# Patient Record
Sex: MALE | Race: WHITE | NOT HISPANIC OR LATINO | Employment: OTHER | URBAN - METROPOLITAN AREA
[De-identification: names, ages, dates, MRNs, and addresses within clinical notes are randomized per-mention and may not be internally consistent; named-entity substitution may affect disease eponyms.]

---

## 2017-01-09 ENCOUNTER — OFFICE VISIT (OUTPATIENT)
Dept: NEUROSURGERY | Facility: CLINIC | Age: 57
End: 2017-01-09

## 2017-01-09 VITALS
DIASTOLIC BLOOD PRESSURE: 92 MMHG | WEIGHT: 315 LBS | BODY MASS INDEX: 42.66 KG/M2 | HEIGHT: 72 IN | SYSTOLIC BLOOD PRESSURE: 156 MMHG

## 2017-01-09 DIAGNOSIS — M51.26 DISPLACEMENT OF LUMBAR INTERVERTEBRAL DISC WITHOUT MYELOPATHY: Primary | ICD-10-CM

## 2017-01-09 PROCEDURE — 99213 OFFICE O/P EST LOW 20 MIN: CPT | Performed by: NEUROLOGICAL SURGERY

## 2017-01-09 RX ORDER — GABAPENTIN 300 MG/1
CAPSULE ORAL
Qty: 540 CAPSULE | Refills: 0 | Status: SHIPPED | OUTPATIENT
Start: 2017-01-09

## 2017-01-09 NOTE — PROGRESS NOTES
"Subjective   Patient ID: Tip Pruitt is a 56 y.o. male who is here today for follow-up for low back pain. He is unaccompanied for this visit today.     History of Present Illness  Overall he feels his pain is \"non existant\".  His lifestyle is much improved.  He has been doing some flights without issue.  [place] go [place].  He remains on gabapentin.  He has infrequent 1/10 pain.  No loss of b/b.  He remains on gabapentin 900 tid.        The following portions of the patient's history were reviewed and updated as appropriate: allergies, current medications, past family history, past medical history, past social history, past surgical history and problem list.    Review of Systems   Musculoskeletal: Negative for back pain.   Neurological: Negative for numbness.   Psychiatric/Behavioral: Negative for sleep disturbance.       Objective   Physical Exam  Neurologic Exam   Motor Exam      Strength    Right iliopsoas: 5/5  Left iliopsoas: 5/5  Right quadriceps: 5/5  Left quadriceps: 5/5  Right hamstrin/5  Left hamstrin/5  Right anterior tibial: 5/5  Left anterior tibial: 5/5  Right gastroc: 5/5  Left gastroc: 5/5  5/5 ehl      Sensory Exam   Right leg light touch: normal  Left leg light touch: normal  Right leg pinprick: normal  Left leg pinprick: normal     Gait, Coordination, and Reflexes      Gait  Gait: normal     Reflexes   Right patellar: 1+  Left patellar: 1+  Right achilles: 1+  Left achilles: 1+  Right ankle clonus: absent  Left ankle clonus: absent      1+ pitting edema (improved)    Assessment/Plan   Independent Review of Radiographic Studies:    I reviewed his MRI and has a far lat disc  Medical Decision Making:    He is pleased with his progress.  He is 95+% better.  We will refer to PT at his request.  He was started on anti-htn. He will continue the gabapentin for 3 more months then attempt to wean.         Tip was seen today for back pain.    Diagnoses and all orders for this " visit:    Displacement of lumbar intervertebral disc without myelopathy    Other orders  -     gabapentin (NEURONTIN) 300 MG capsule; Take 3 capsules tid      No Follow-up on file.

## 2017-01-09 NOTE — MR AVS SNAPSHOT
Tip Pruitt   1/9/2017 2:45 PM   Office Visit    Dept Phone:  [phone]   Encounter #:  [id number]    Provider:  Cooper Fleming IV, MD   Department:  [facility]                Your Full Care Plan              Today's Medication Changes          These changes are accurate as of: 1/9/17  3:30 PM.  If you have any questions, ask your nurse or doctor.               Medication(s)that have changed:     gabapentin 300 MG capsule   Commonly known as:  NEURONTIN   Take 3 capsules tid   What changed:    - how much to take  - how to take this  - when to take this  - additional instructions   Changed by:  Cooper Fleming IV, MD            Where to Get Your Medications      These medications were sent to [facility] - [place], [place] - [address] [place] [address] - [phone] [place] [id number] [address] [address]     Phone:  [phone]     gabapentin 300 MG capsule                  Your Updated Medication List          This list is accurate as of: 1/9/17  3:30 PM.  Always use your most recent med list.                acetaminophen 325 MG tablet   Commonly known as:  TYLENOL       aspirin 81 MG chewable tablet       atenolol 100 MG tablet   Commonly known as:  TENORMIN   Take 1 tablet by mouth daily.       gabapentin 300 MG capsule   Commonly known as:  NEURONTIN   Take 3 capsules tid       ibuprofen 100 MG tablet   Commonly known as:  ADVIL,MOTRIN       indapamide 2.5 MG tablet   Commonly known as:  LOZOL   Take 1 tablet by mouth Every Morning.       simvastatin 20 MG tablet   Commonly known as:  ZOCOR   Take 1 tablet by mouth every night.               You Were Diagnosed With        Codes Comments    Displacement of lumbar intervertebral disc without myelopathy    -  Primary ICD-10-CM: M51.26  ICD-9-CM: 722.10       Instructions     None    Patient Instructions History      Upcoming Appointments     Visit Type Date Time  "Department    OFFICE VISIT 1/9/2017  2:45 PM [facility]    OFFICE VISIT 6/5/2017  8:00 AM [facility]      MyChart Signup     Our records indicate that you have an active [facility] [employer] account.    You can view your After Visit Summary by going to [employer] and logging in with your [employer] username and password.  If you don't have a [employer] username and password but a parent or guardian has access to your record, the parent or guardian should login with their own [employer] username and password and access your record to view the After Visit Summary.    If you have questions, you can email [email or web address] or call [phone] to talk to our [employer] staff.  Remember, [employer] is NOT to be used for urgent needs.  For medical emergencies, dial 911.               Other Info from Your Visit           Your Appointments     Jun 05, 2017  8:00 AM EDT   Office Visit with Yohana Jeffries MD   [facility] MED[facility] (--)    [address] [address] [address] [phone]           Arrive 15 minutes prior to appointment.              Allergies     Latex      Sensitive to adhesive part of bandaid/not sure if it is to the latex    Naprosyn [Naproxen]        Reason for Visit     Back Pain           Vital Signs     Blood Pressure Height Weight Body Mass Index Smoking Status       156/92 (BP Location: Left arm, Patient Position: Sitting, Cuff Size: Adult) 72\" (182.9 cm) 364 lb (165 kg) 49.37 kg/m2 Never Smoker       Problems and Diagnoses Noted     Bulging disc of backbone        "

## 2017-01-30 DIAGNOSIS — E78.5 HYPERLIPIDEMIA, UNSPECIFIED HYPERLIPIDEMIA TYPE: ICD-10-CM

## 2017-01-30 DIAGNOSIS — I10 ESSENTIAL HYPERTENSION: ICD-10-CM

## 2017-01-30 RX ORDER — SIMVASTATIN 20 MG
20 TABLET ORAL NIGHTLY
Qty: 90 TABLET | Refills: 1 | Status: SHIPPED | OUTPATIENT
Start: 2017-01-30 | End: 2017-05-18 | Stop reason: SDUPTHER

## 2017-01-30 RX ORDER — ATENOLOL 100 MG/1
100 TABLET ORAL DAILY
Qty: 90 TABLET | Refills: 1 | Status: SHIPPED | OUTPATIENT
Start: 2017-01-30 | End: 2017-05-18 | Stop reason: SDUPTHER

## 2017-01-30 RX ORDER — INDAPAMIDE 2.5 MG/1
2.5 TABLET, FILM COATED ORAL EVERY MORNING
Qty: 90 TABLET | Refills: 1 | Status: SHIPPED | OUTPATIENT
Start: 2017-01-30 | End: 2017-02-06 | Stop reason: SDUPTHER

## 2017-02-03 DIAGNOSIS — I10 ESSENTIAL HYPERTENSION: ICD-10-CM

## 2017-02-04 RX ORDER — AMLODIPINE BESYLATE 2.5 MG/1
TABLET ORAL
Qty: 90 TABLET | Refills: 0 | OUTPATIENT
Start: 2017-02-04

## 2017-02-06 DIAGNOSIS — I10 ESSENTIAL HYPERTENSION: ICD-10-CM

## 2017-02-06 RX ORDER — INDAPAMIDE 2.5 MG/1
2.5 TABLET, FILM COATED ORAL EVERY MORNING
Qty: 90 TABLET | Refills: 1 | Status: SHIPPED | OUTPATIENT
Start: 2017-02-06

## 2017-02-13 ENCOUNTER — TELEPHONE (OUTPATIENT)
Dept: FAMILY MEDICINE CLINIC | Facility: CLINIC | Age: 57
End: 2017-02-13

## 2017-02-13 DIAGNOSIS — I10 ESSENTIAL HYPERTENSION: ICD-10-CM

## 2017-02-13 RX ORDER — AMLODIPINE BESYLATE 2.5 MG/1
TABLET ORAL
Qty: 90 TABLET | Refills: 0 | Status: SHIPPED | OUTPATIENT
Start: 2017-02-13 | End: 2017-02-13 | Stop reason: SDUPTHER

## 2017-02-13 RX ORDER — AMLODIPINE BESYLATE 2.5 MG/1
2.5 TABLET ORAL DAILY
Qty: 14 TABLET | Refills: 0 | Status: SHIPPED | OUTPATIENT
Start: 2017-02-13

## 2017-03-30 ENCOUNTER — TELEPHONE (OUTPATIENT)
Dept: NEUROSURGERY | Facility: CLINIC | Age: 57
End: 2017-03-30

## 2017-03-30 NOTE — TELEPHONE ENCOUNTER
----- Message from Daksha Joyce sent at 3/30/2017  3:06 PM EDT -----  Regarding: MEDICATION QUESTION  Contact: [phone]  Pt was last seen on 1/9/17 for lumbar and it was being treated with medication.  Pt said that he and Dr Fleming talked about him   backing off the Gabapentin 300 mg.  He did back off and was suddenly hit with the pain. He said it is in his right thigh.  He wants to know if he needs an ppt to get a refill or if Dr Fleming will give him one.  Pt would like to keep taking the medicine for his pain.    Thank you

## 2017-04-13 ENCOUNTER — TELEPHONE (OUTPATIENT)
Dept: NEUROSURGERY | Facility: CLINIC | Age: 57
End: 2017-04-13

## 2017-04-13 NOTE — TELEPHONE ENCOUNTER
Patient called and spoke to Daksha and requested a refill on his Gabapentin 300 mg x90 days. Per [facility]:  Okay to refill. I called [facility] and spoke to La.

## 2017-05-01 ENCOUNTER — TELEPHONE (OUTPATIENT)
Dept: NEUROSURGERY | Facility: CLINIC | Age: 57
End: 2017-05-01

## 2017-05-01 DIAGNOSIS — M54.5 LOW BACK PAIN, UNSPECIFIED BACK PAIN LATERALITY, UNSPECIFIED CHRONICITY, WITH SCIATICA PRESENCE UNSPECIFIED: Primary | ICD-10-CM

## 2017-05-07 DIAGNOSIS — I10 ESSENTIAL HYPERTENSION: ICD-10-CM

## 2017-05-08 RX ORDER — AMLODIPINE BESYLATE 2.5 MG/1
TABLET ORAL
Qty: 90 TABLET | Refills: 0 | Status: SHIPPED | OUTPATIENT
Start: 2017-05-08

## 2017-05-18 DIAGNOSIS — I10 ESSENTIAL HYPERTENSION: ICD-10-CM

## 2017-05-18 DIAGNOSIS — E78.5 HYPERLIPIDEMIA, UNSPECIFIED HYPERLIPIDEMIA TYPE: ICD-10-CM

## 2017-05-18 RX ORDER — ATENOLOL 100 MG/1
100 TABLET ORAL DAILY
Qty: 90 TABLET | Refills: 0 | Status: SHIPPED | OUTPATIENT
Start: 2017-05-18

## 2017-05-18 RX ORDER — SIMVASTATIN 20 MG
20 TABLET ORAL NIGHTLY
Qty: 90 TABLET | Refills: 0 | Status: SHIPPED | OUTPATIENT
Start: 2017-05-18

## 2017-07-21 ENCOUNTER — TELEPHONE (OUTPATIENT)
Dept: NEUROSURGERY | Facility: CLINIC | Age: 57
End: 2017-07-21

## 2017-07-21 NOTE — TELEPHONE ENCOUNTER
I received a call from [employer] to approve a refill for Gabapentin. I advised that this patient needs to see PCP we are no longer following this patient.

## 2020-03-23 ENCOUNTER — TRANSCRIBE ORDERS (OUTPATIENT)
Dept: ADMINISTRATIVE | Facility: HOSPITAL | Age: 60
End: 2020-03-23

## 2020-03-23 DIAGNOSIS — R10.11 ABDOMINAL PAIN, RIGHT UPPER QUADRANT: Primary | ICD-10-CM

## 2020-03-26 ENCOUNTER — HOSPITAL ENCOUNTER (OUTPATIENT)
Dept: CT IMAGING | Facility: HOSPITAL | Age: 60
Discharge: HOME | End: 2020-03-26
Admitting: UROLOGY

## 2020-03-26 ENCOUNTER — APPOINTMENT (OUTPATIENT)
Dept: CT IMAGING | Facility: HOSPITAL | Age: 60
End: 2020-03-26

## 2020-03-26 DIAGNOSIS — R10.11 ABDOMINAL PAIN, RIGHT UPPER QUADRANT: ICD-10-CM

## 2020-03-26 PROCEDURE — 74176 CT ABD & PELVIS W/O CONTRAST: CPT

## 2021-03-22 ENCOUNTER — BULK ORDERING (OUTPATIENT)
Dept: CASE MANAGEMENT | Facility: OTHER | Age: 61
End: 2021-03-22

## 2021-03-22 DIAGNOSIS — Z23 IMMUNIZATION DUE: ICD-10-CM

## 2021-04-16 ENCOUNTER — APPOINTMENT (OUTPATIENT)
Dept: VACCINE CLINIC | Facility: HOSPITAL | Age: 61
End: 2021-04-16

## 2021-04-24 ENCOUNTER — IMMUNIZATION (OUTPATIENT)
Dept: VACCINE CLINIC | Facility: HOSPITAL | Age: 61
End: 2021-04-24

## 2021-04-24 PROCEDURE — 91300 HC SARSCOV02 VAC 30MCG/0.3ML IM: CPT | Performed by: INTERNAL MEDICINE

## 2021-04-24 PROCEDURE — 0001A: CPT | Performed by: INTERNAL MEDICINE

## 2021-05-15 ENCOUNTER — IMMUNIZATION (OUTPATIENT)
Dept: VACCINE CLINIC | Facility: HOSPITAL | Age: 61
End: 2021-05-15

## 2021-05-15 PROCEDURE — 0002A: CPT | Performed by: INTERNAL MEDICINE

## 2021-05-15 PROCEDURE — 91300 HC SARSCOV02 VAC 30MCG/0.3ML IM: CPT | Performed by: INTERNAL MEDICINE

## 2023-04-15 ENCOUNTER — TELEMEDICINE (OUTPATIENT)
Dept: FAMILY MEDICINE CLINIC | Facility: TELEHEALTH | Age: 63
End: 2023-04-15
Payer: COMMERCIAL

## 2023-04-15 DIAGNOSIS — N41.0 ACUTE PROSTATITIS: Primary | ICD-10-CM

## 2023-04-15 RX ORDER — LOSARTAN POTASSIUM 50 MG/1
1 TABLET ORAL DAILY
COMMUNITY
Start: 2023-02-01 | End: 2023-04-15

## 2023-04-15 RX ORDER — EPINEPHRINE 0.3 MG/.3ML
0.3 INJECTION SUBCUTANEOUS DAILY PRN
COMMUNITY
Start: 2022-09-27 | End: 2023-09-27

## 2023-04-15 RX ORDER — SULFAMETHOXAZOLE AND TRIMETHOPRIM 800; 160 MG/1; MG/1
1 TABLET ORAL 2 TIMES DAILY
Qty: 14 TABLET | Refills: 0 | Status: SHIPPED | OUTPATIENT
Start: 2023-04-15 | End: 2023-04-22

## 2023-04-15 RX ORDER — ATORVASTATIN CALCIUM 40 MG/1
1 TABLET, FILM COATED ORAL DAILY
COMMUNITY
Start: 2023-03-23

## 2023-04-15 RX ORDER — TAMSULOSIN HYDROCHLORIDE 0.4 MG/1
1 CAPSULE ORAL EVERY 12 HOURS SCHEDULED
COMMUNITY
Start: 2023-03-29

## 2023-04-15 RX ORDER — LOSARTAN POTASSIUM 50 MG/1
1 TABLET ORAL DAILY
COMMUNITY
Start: 2019-03-01

## 2023-04-15 RX ORDER — SILDENAFIL CITRATE 20 MG/1
20 TABLET ORAL DAILY PRN
COMMUNITY
Start: 2017-06-01

## 2023-04-15 RX ORDER — ATORVASTATIN CALCIUM 40 MG/1
TABLET, FILM COATED ORAL
COMMUNITY
Start: 2019-03-01 | End: 2023-04-15

## 2023-04-15 RX ORDER — BLOOD SUGAR DIAGNOSTIC
STRIP MISCELLANEOUS DAILY
COMMUNITY
Start: 2023-04-02

## 2023-04-15 RX ORDER — ASPIRIN 81 MG/1
TABLET, CHEWABLE ORAL
COMMUNITY
End: 2023-04-15

## 2023-04-15 RX ORDER — ACETAMINOPHEN 325 MG/1
650 TABLET ORAL
COMMUNITY

## 2023-04-15 NOTE — PROGRESS NOTES
CHIEF COMPLAINT  Chief Complaint   Patient presents with   • Urinary Frequency         HPI  Tip Pruitt is a 62 y.o. male  presents with complaint of prostatitis. He has a history and sees a urologist. He takes Bactrim DS and this resolves his infections.   He has prostate tenderness, urinary urgency and mild burning.     Review of Systems   Constitutional: Negative for chills, diaphoresis, fatigue and fever.   Gastrointestinal: Negative for nausea and vomiting.   Genitourinary: Positive for dysuria, frequency and urgency. Negative for decreased urine volume, difficulty urinating, enuresis, flank pain, genital sores, hematuria, penile discharge, penile pain, penile swelling, scrotal swelling and testicular pain.       Past Medical History:   Diagnosis Date   • Hyperlipidemia    • Hypertension    • Obesity    • Sleep apnea        Family History   Problem Relation Age of Onset   • Diabetes type II Father    • Pancreatic cancer Father    • Diabetes type II Brother    • Glaucoma Maternal Grandmother    • Stroke Maternal Grandmother    • Alcohol abuse Maternal Grandfather    • Diabetes type II Paternal Grandfather    • Heart disease Paternal Grandfather        Social History     Socioeconomic History   • Marital status:    Tobacco Use   • Smoking status: Never     Passive exposure: Never   • Smokeless tobacco: Never   Vaping Use   • Vaping Use: Never used   Substance and Sexual Activity   • Alcohol use: Yes     Comment: rare   • Drug use: No   • Sexual activity: Yes     Partners: Female       Tip Pruitt  reports that he has never smoked. He has never been exposed to tobacco smoke. He has never used smokeless tobacco.       There were no vitals taken for this visit.    PHYSICAL EXAM  Physical Exam   Constitutional: He is oriented to person, place, and time. He appears well-developed and well-nourished. He does not have a sickly appearance. He does not appear ill. No distress.   HENT:   Head:  Normocephalic and atraumatic.   Eyes: EOM are normal.   Neck: Neck normal appearance.  Pulmonary/Chest: Effort normal.  No respiratory distress.  Neurological: He is alert and oriented to person, place, and time.   Skin: Skin is dry.   Psychiatric: He has a normal mood and affect.       No results found for this or any previous visit.    Diagnoses and all orders for this visit:    1. Acute prostatitis (Primary)    Other orders  -     sulfamethoxazole-trimethoprim (Bactrim DS) 800-160 MG per tablet; Take 1 tablet by mouth 2 (Two) Times a Day for 7 days.  Dispense: 14 tablet; Refill: 0    Continue to follow up with your urologist.     The use of a video visit has been reviewed with the patient and verbal informed consent has been obtained. Myself and Tip Pruitt participated in this visit. The patient is located in [address] [place] [address]. I am located in [place], [place]. [employer] and [employer] were utilized.       Note Disclaimer: At [facility], we believe that sharing information builds trust and better   relationships. You are receiving this note because you recently visited [facility]. It is possible you   will see health information before a provider has talked with you about it. This kind of information can   be easy to misunderstand. To help you fully understand what it means for your health, we urge you to   discuss this note with your provider.    Lacie Madden, MISSY  04/15/2023  10:07 EDT

## 2023-04-15 NOTE — PATIENT INSTRUCTIONS
Drink plenty of water and avoid caffeine  If symptoms do not improve in 3-5 days follow up with your primary care provider or urgent care        Prostatitis    Prostatitis is swelling of the prostate gland, also called the prostate. This gland is about 1.5 inches wide and 1 inch high, and it helps to make a fluid called semen. The prostate is below a man's bladder, in front of the butt (rectum). There are different types of prostatitis.  What are the causes?  One type of prostatitis is caused by an infection from germs (bacteria).  Another type is not caused by germs. It may be caused by:  Things having to do with the nervous system. This system includes thebrain, spinal cord, and nerves.  An autoimmune response. This happens when the body's disease-fighting system attacks healthy tissue in the body by mistake.  Psychological factors. These have to do with how the mind works.  The causes of other types of prostatitis are normally not known.  What are the signs or symptoms?  Symptoms of this condition depend on the type of prostatitis you have.  If your condition is caused by germs:  You may feel pain or burning when you pee (urinate).  You may pee often and all of a sudden.  You may have problems starting to pee.  You may have trouble emptying your bladder when you pee.  You may have fever or chills.  You may feel pain in your muscles, joints, low back, or lower belly.  If you have other types of prostatitis:  You may pee often or all of a sudden.  You may have trouble starting to pee.  You may have a weak flow when you pee.  You may leak pee after using the bathroom.  You may have other problems, such as:  Abnormal fluid coming from the penis.  Pain in the testicles or penis.  Pain between the butt and the testicles.  Pain when fluid comes out of the penis during sex.  How is this treated?  Treatment for this condition depends on the type of prostatitis. Treatment may include:  Medicines. These may treat pain or  swelling, or they may help relax muscles.  Exercises to help you move better or get stronger (physical therapy).  Heat therapy.  Techniques to help you control some of the ways that your body works.  Exercises to help you relax.  Antibiotic medicine, if your condition is caused by germs.  Warm water baths (sitz baths) to relax muscles.  Follow these instructions at home:  Medicines  Take over-the-counter and prescription medicines only as told by your doctor.  If you were prescribed an antibiotic medicine, take it as told by your doctor. Do not stop using the antibiotic even if you start to feel better.  Managing pain and swelling    Take sitz baths as told by your doctor. For a sitz bath, sit in warm water that is deep enough to cover your hips and butt.  If told, put heat on the painful area. Do this as often as told by your doctor. Use the heat source that your doctor recommends, such as a moist heat pack or a heating pad.  Place a towel between your skin and the heat source.  Leave the heat on for 20-30 minutes.  Take off the heat if your skin turns bright red. This is very important if you are unable to feel pain, heat, or cold. You may have a greater risk of getting burned.  General instructions  Do exercises as told by your doctor, if your doctor prescribed them.  Keep all follow-up visits as told by your doctor. This is important.  Where to find more information  National [place] of Diabetes and Digestive and Kidney Diseases: https://www.niddk.nih.gov  Contact a doctor if:  Your symptoms get worse.  You have a fever.  Get help right away if:  You have chills.  You feel light-headed.  You feel like you may faint.  You cannot pee.  You have blood or clumps of blood (blood clots) in your pee.  Summary  Prostatitis is swelling of the prostate gland.  There are different types of prostatitis. Treatment depends on the type that you have.  Take over-the-counter and prescription medicines only as told by your  doctor.  Get help right away of you have chills, feel light-headed, or feel like you may faint. Also get help right away if you cannot pee or you have blood or clumps of blood in your pee.  This information is not intended to replace advice given to you by your health care provider. Make sure you discuss any questions you have with your health care provider.  Document Revised: 01/22/2021 Document Reviewed: 01/22/2021  Elsevier Patient Education © 2022 Elsevier Inc.

## 2025-05-30 ENCOUNTER — TRANSCRIBE ORDERS (OUTPATIENT)
Dept: ADMINISTRATIVE | Facility: HOSPITAL | Age: 65
End: 2025-05-30
Payer: COMMERCIAL

## 2025-05-30 DIAGNOSIS — N18.2 CKD (CHRONIC KIDNEY DISEASE) STAGE 2, GFR 60-89 ML/MIN: Primary | ICD-10-CM

## 2025-07-09 ENCOUNTER — APPOINTMENT (OUTPATIENT)
Dept: NEUROLOGY | Facility: HOSPITAL | Age: 65
End: 2025-07-09
Payer: COMMERCIAL

## 2025-07-09 ENCOUNTER — APPOINTMENT (OUTPATIENT)
Dept: MRI IMAGING | Facility: HOSPITAL | Age: 65
End: 2025-07-09
Payer: COMMERCIAL

## 2025-07-09 ENCOUNTER — APPOINTMENT (OUTPATIENT)
Dept: GENERAL RADIOLOGY | Facility: HOSPITAL | Age: 65
End: 2025-07-09
Payer: COMMERCIAL

## 2025-07-09 ENCOUNTER — APPOINTMENT (OUTPATIENT)
Dept: CT IMAGING | Facility: HOSPITAL | Age: 65
End: 2025-07-09
Payer: COMMERCIAL

## 2025-07-09 ENCOUNTER — HOSPITAL ENCOUNTER (OUTPATIENT)
Facility: HOSPITAL | Age: 65
Setting detail: OBSERVATION
Discharge: HOME | End: 2025-07-10
Attending: EMERGENCY MEDICINE | Admitting: EMERGENCY MEDICINE
Payer: COMMERCIAL

## 2025-07-09 DIAGNOSIS — G45.4 TGA (TRANSIENT GLOBAL AMNESIA): Primary | ICD-10-CM

## 2025-07-09 LAB
ALBUMIN SERPL-MCNC: 3.9 G/DL (ref 3.5–5.2)
ALBUMIN/GLOB SERPL: 1.1 G/DL
ALP SERPL-CCNC: 52 U/L (ref 39–117)
ALT SERPL W P-5'-P-CCNC: 21 U/L (ref 1–41)
ANION GAP SERPL CALCULATED.3IONS-SCNC: 9.4 MMOL/L (ref 5–15)
APTT PPP: 28.8 SECONDS (ref 22.7–35.4)
AST SERPL-CCNC: 24 U/L (ref 1–40)
BACTERIA UR QL AUTO: ABNORMAL /HPF
BASOPHILS # BLD AUTO: 0.04 10*3/MM3 (ref 0–0.2)
BASOPHILS NFR BLD AUTO: 0.7 % (ref 0–1.5)
BILIRUB SERPL-MCNC: 0.7 MG/DL (ref 0–1.2)
BILIRUB UR QL STRIP: NEGATIVE
BUN SERPL-MCNC: 18 MG/DL (ref 8–23)
BUN/CREAT SERPL: 15.8 (ref 7–25)
CALCIUM SPEC-SCNC: 9.7 MG/DL (ref 8.6–10.5)
CHLORIDE SERPL-SCNC: 106 MMOL/L (ref 98–107)
CHOLEST SERPL-MCNC: 142 MG/DL (ref 0–200)
CLARITY UR: CLEAR
CO2 SERPL-SCNC: 25.6 MMOL/L (ref 22–29)
COLOR UR: YELLOW
CREAT SERPL-MCNC: 1.14 MG/DL (ref 0.76–1.27)
DEPRECATED RDW RBC AUTO: 44.9 FL (ref 37–54)
EGFRCR SERPLBLD CKD-EPI 2021: 71.8 ML/MIN/1.73
EOSINOPHIL # BLD AUTO: 0.11 10*3/MM3 (ref 0–0.4)
EOSINOPHIL NFR BLD AUTO: 1.9 % (ref 0.3–6.2)
ERYTHROCYTE [DISTWIDTH] IN BLOOD BY AUTOMATED COUNT: 13.9 % (ref 12.3–15.4)
GLOBULIN UR ELPH-MCNC: 3.4 GM/DL
GLUCOSE BLDC GLUCOMTR-MCNC: 108 MG/DL (ref 70–130)
GLUCOSE BLDC GLUCOMTR-MCNC: 82 MG/DL (ref 70–130)
GLUCOSE BLDC GLUCOMTR-MCNC: 97 MG/DL (ref 70–130)
GLUCOSE SERPL-MCNC: 108 MG/DL (ref 65–99)
GLUCOSE UR STRIP-MCNC: NEGATIVE MG/DL
HBA1C MFR BLD: 6.1 % (ref 4.8–5.6)
HCT VFR BLD AUTO: 52.1 % (ref 37.5–51)
HDLC SERPL-MCNC: 46 MG/DL (ref 40–60)
HGB BLD-MCNC: 16.9 G/DL (ref 13–17.7)
HGB UR QL STRIP.AUTO: ABNORMAL
HOLD SPECIMEN: NORMAL
HOLD SPECIMEN: NORMAL
HYALINE CASTS UR QL AUTO: ABNORMAL /LPF
IMM GRANULOCYTES # BLD AUTO: 0.02 10*3/MM3 (ref 0–0.05)
IMM GRANULOCYTES NFR BLD AUTO: 0.3 % (ref 0–0.5)
INR PPP: 1.04 (ref 0.9–1.1)
KETONES UR QL STRIP: NEGATIVE
LDLC SERPL CALC-MCNC: 77 MG/DL (ref 0–100)
LDLC/HDLC SERPL: 1.63 {RATIO}
LEUKOCYTE ESTERASE UR QL STRIP.AUTO: NEGATIVE
LYMPHOCYTES # BLD AUTO: 1.04 10*3/MM3 (ref 0.7–3.1)
LYMPHOCYTES NFR BLD AUTO: 17.9 % (ref 19.6–45.3)
MCH RBC QN AUTO: 28.9 PG (ref 26.6–33)
MCHC RBC AUTO-ENTMCNC: 32.4 G/DL (ref 31.5–35.7)
MCV RBC AUTO: 89.1 FL (ref 79–97)
MONOCYTES # BLD AUTO: 0.59 10*3/MM3 (ref 0.1–0.9)
MONOCYTES NFR BLD AUTO: 10.1 % (ref 5–12)
NEUTROPHILS NFR BLD AUTO: 4.02 10*3/MM3 (ref 1.7–7)
NEUTROPHILS NFR BLD AUTO: 69.1 % (ref 42.7–76)
NITRITE UR QL STRIP: NEGATIVE
NRBC BLD AUTO-RTO: 0 /100 WBC (ref 0–0.2)
PH UR STRIP.AUTO: 6.5 [PH] (ref 5–8)
PLATELET # BLD AUTO: 188 10*3/MM3 (ref 140–450)
PMV BLD AUTO: 10 FL (ref 6–12)
POTASSIUM SERPL-SCNC: 4.3 MMOL/L (ref 3.5–5.2)
PROT SERPL-MCNC: 7.3 G/DL (ref 6–8.5)
PROT UR QL STRIP: ABNORMAL
PROTHROMBIN TIME: 13.5 SECONDS (ref 11.7–14.2)
RBC # BLD AUTO: 5.85 10*6/MM3 (ref 4.14–5.8)
RBC # UR STRIP: ABNORMAL /HPF
REF LAB TEST METHOD: ABNORMAL
SODIUM SERPL-SCNC: 141 MMOL/L (ref 136–145)
SP GR UR STRIP: >1.03 (ref 1–1.03)
SQUAMOUS #/AREA URNS HPF: ABNORMAL /HPF
TRIGL SERPL-MCNC: 105 MG/DL (ref 0–150)
UROBILINOGEN UR QL STRIP: ABNORMAL
VLDLC SERPL-MCNC: 19 MG/DL (ref 5–40)
WBC # UR STRIP: ABNORMAL /HPF
WBC NRBC COR # BLD AUTO: 5.82 10*3/MM3 (ref 3.4–10.8)
WHOLE BLOOD HOLD COAG: NORMAL
WHOLE BLOOD HOLD SPECIMEN: NORMAL

## 2025-07-09 PROCEDURE — 85025 COMPLETE CBC W/AUTO DIFF WBC: CPT | Performed by: EMERGENCY MEDICINE

## 2025-07-09 PROCEDURE — 81001 URINALYSIS AUTO W/SCOPE: CPT | Performed by: NURSE PRACTITIONER

## 2025-07-09 PROCEDURE — G0378 HOSPITAL OBSERVATION PER HR: HCPCS

## 2025-07-09 PROCEDURE — 25510000001 IOPAMIDOL PER 1 ML: Performed by: EMERGENCY MEDICINE

## 2025-07-09 PROCEDURE — 80053 COMPREHEN METABOLIC PANEL: CPT | Performed by: EMERGENCY MEDICINE

## 2025-07-09 PROCEDURE — 96374 THER/PROPH/DIAG INJ IV PUSH: CPT

## 2025-07-09 PROCEDURE — 85610 PROTHROMBIN TIME: CPT | Performed by: EMERGENCY MEDICINE

## 2025-07-09 PROCEDURE — 95816 EEG AWAKE AND DROWSY: CPT | Performed by: PSYCHIATRY & NEUROLOGY

## 2025-07-09 PROCEDURE — 82948 REAGENT STRIP/BLOOD GLUCOSE: CPT

## 2025-07-09 PROCEDURE — 25010000002 LABETALOL 5 MG/ML SOLUTION: Performed by: EMERGENCY MEDICINE

## 2025-07-09 PROCEDURE — 83036 HEMOGLOBIN GLYCOSYLATED A1C: CPT | Performed by: NURSE PRACTITIONER

## 2025-07-09 PROCEDURE — 70551 MRI BRAIN STEM W/O DYE: CPT

## 2025-07-09 PROCEDURE — 85730 THROMBOPLASTIN TIME PARTIAL: CPT | Performed by: NURSE PRACTITIONER

## 2025-07-09 PROCEDURE — 95819 EEG AWAKE AND ASLEEP: CPT

## 2025-07-09 PROCEDURE — 99285 EMERGENCY DEPT VISIT HI MDM: CPT

## 2025-07-09 PROCEDURE — 70498 CT ANGIOGRAPHY NECK: CPT

## 2025-07-09 PROCEDURE — 80061 LIPID PANEL: CPT | Performed by: NURSE PRACTITIONER

## 2025-07-09 PROCEDURE — 71045 X-RAY EXAM CHEST 1 VIEW: CPT

## 2025-07-09 PROCEDURE — 93005 ELECTROCARDIOGRAM TRACING: CPT | Performed by: EMERGENCY MEDICINE

## 2025-07-09 PROCEDURE — 70496 CT ANGIOGRAPHY HEAD: CPT

## 2025-07-09 PROCEDURE — 93010 ELECTROCARDIOGRAM REPORT: CPT | Performed by: INTERNAL MEDICINE

## 2025-07-09 RX ORDER — SODIUM CHLORIDE 9 MG/ML
40 INJECTION, SOLUTION INTRAVENOUS AS NEEDED
Status: DISCONTINUED | OUTPATIENT
Start: 2025-07-09 | End: 2025-07-10 | Stop reason: HOSPADM

## 2025-07-09 RX ORDER — ATENOLOL 50 MG/1
100 TABLET ORAL DAILY
Status: DISCONTINUED | OUTPATIENT
Start: 2025-07-09 | End: 2025-07-10 | Stop reason: HOSPADM

## 2025-07-09 RX ORDER — SODIUM CHLORIDE 0.9 % (FLUSH) 0.9 %
10 SYRINGE (ML) INJECTION AS NEEDED
Status: DISCONTINUED | OUTPATIENT
Start: 2025-07-09 | End: 2025-07-10 | Stop reason: HOSPADM

## 2025-07-09 RX ORDER — ASPIRIN 300 MG/1
300 SUPPOSITORY RECTAL DAILY
Status: DISCONTINUED | OUTPATIENT
Start: 2025-07-09 | End: 2025-07-10 | Stop reason: HOSPADM

## 2025-07-09 RX ORDER — ACETAMINOPHEN 325 MG/1
650 TABLET ORAL EVERY 4 HOURS PRN
Status: DISCONTINUED | OUTPATIENT
Start: 2025-07-09 | End: 2025-07-10 | Stop reason: HOSPADM

## 2025-07-09 RX ORDER — ONDANSETRON 2 MG/ML
4 INJECTION INTRAMUSCULAR; INTRAVENOUS EVERY 6 HOURS PRN
Status: DISCONTINUED | OUTPATIENT
Start: 2025-07-09 | End: 2025-07-10 | Stop reason: HOSPADM

## 2025-07-09 RX ORDER — ATORVASTATIN CALCIUM 20 MG/1
40 TABLET, FILM COATED ORAL NIGHTLY
Status: DISCONTINUED | OUTPATIENT
Start: 2025-07-09 | End: 2025-07-09

## 2025-07-09 RX ORDER — ASPIRIN 325 MG
325 TABLET ORAL DAILY
Status: DISCONTINUED | OUTPATIENT
Start: 2025-07-09 | End: 2025-07-10 | Stop reason: HOSPADM

## 2025-07-09 RX ORDER — LOSARTAN POTASSIUM 50 MG/1
50 TABLET ORAL EVERY 12 HOURS
Status: DISCONTINUED | OUTPATIENT
Start: 2025-07-09 | End: 2025-07-10 | Stop reason: HOSPADM

## 2025-07-09 RX ORDER — LABETALOL HYDROCHLORIDE 5 MG/ML
10 INJECTION, SOLUTION INTRAVENOUS ONCE
Status: COMPLETED | OUTPATIENT
Start: 2025-07-09 | End: 2025-07-09

## 2025-07-09 RX ORDER — ATORVASTATIN CALCIUM 20 MG/1
40 TABLET, FILM COATED ORAL NIGHTLY
Status: DISCONTINUED | OUTPATIENT
Start: 2025-07-09 | End: 2025-07-10 | Stop reason: HOSPADM

## 2025-07-09 RX ORDER — TAMSULOSIN HYDROCHLORIDE 0.4 MG/1
0.4 CAPSULE ORAL DAILY
Status: DISCONTINUED | OUTPATIENT
Start: 2025-07-09 | End: 2025-07-10 | Stop reason: HOSPADM

## 2025-07-09 RX ORDER — SODIUM CHLORIDE 0.9 % (FLUSH) 0.9 %
10 SYRINGE (ML) INJECTION EVERY 12 HOURS SCHEDULED
Status: DISCONTINUED | OUTPATIENT
Start: 2025-07-09 | End: 2025-07-10 | Stop reason: HOSPADM

## 2025-07-09 RX ORDER — INSULIN LISPRO 100 [IU]/ML
2-9 INJECTION, SOLUTION INTRAVENOUS; SUBCUTANEOUS
Status: DISCONTINUED | OUTPATIENT
Start: 2025-07-09 | End: 2025-07-10 | Stop reason: HOSPADM

## 2025-07-09 RX ORDER — IOPAMIDOL 755 MG/ML
100 INJECTION, SOLUTION INTRAVASCULAR
Status: COMPLETED | OUTPATIENT
Start: 2025-07-09 | End: 2025-07-09

## 2025-07-09 RX ORDER — NICOTINE POLACRILEX 4 MG
15 LOZENGE BUCCAL
Status: DISCONTINUED | OUTPATIENT
Start: 2025-07-09 | End: 2025-07-10 | Stop reason: HOSPADM

## 2025-07-09 RX ORDER — DEXTROSE MONOHYDRATE 25 G/50ML
25 INJECTION, SOLUTION INTRAVENOUS
Status: DISCONTINUED | OUTPATIENT
Start: 2025-07-09 | End: 2025-07-10 | Stop reason: HOSPADM

## 2025-07-09 RX ORDER — IBUPROFEN 600 MG/1
1 TABLET ORAL
Status: DISCONTINUED | OUTPATIENT
Start: 2025-07-09 | End: 2025-07-10 | Stop reason: HOSPADM

## 2025-07-09 RX ADMIN — LABETALOL HYDROCHLORIDE 10 MG: 5 INJECTION, SOLUTION INTRAVENOUS at 14:05

## 2025-07-09 RX ADMIN — Medication 10 ML: at 23:54

## 2025-07-09 RX ADMIN — ATENOLOL 100 MG: 50 TABLET ORAL at 18:46

## 2025-07-09 RX ADMIN — ASPIRIN 325 MG ORAL TABLET 325 MG: 325 PILL ORAL at 18:24

## 2025-07-09 RX ADMIN — LOSARTAN POTASSIUM 50 MG: 50 TABLET, FILM COATED ORAL at 18:25

## 2025-07-09 RX ADMIN — TAMSULOSIN HYDROCHLORIDE 0.4 MG: 0.4 CAPSULE ORAL at 18:26

## 2025-07-09 RX ADMIN — IOPAMIDOL 100 ML: 755 INJECTION, SOLUTION INTRAVENOUS at 15:58

## 2025-07-09 NOTE — ED NOTES
Nursing report ED to floor  Tip Pruitt  64 y.o.  male    HPI :  HPI  Stated Reason for Visit: ams x1hr    Chief Complaint  Chief Complaint   Patient presents with    Altered Mental Status       Admitting doctor:   Matias Eugene MD    Admitting diagnosis:   The encounter diagnosis was TGA (transient global amnesia).    Code status:   Current Code Status       Date Active Code Status Order ID Comments User Context       7/9/2025 1604 CPR (Attempt to Resuscitate) [id number]  Kiley Levy APRN ED        Question Answer    Code Status (Patient has no pulse and is not breathing) CPR (Attempt to Resuscitate)    Medical Interventions (Patient has pulse or is breathing) Full Support    Level Of Support Discussed With Patient                    Allergies:   Naprosyn [naproxen] and Other    Isolation:   No active isolations    Intake and Output  No intake or output data in the 24 hours ending 07/09/25 1610    Weight:   There were no vitals filed for this visit.    Most recent vitals:   Vitals:    07/09/25 1351 07/09/25 1400 07/09/25 1405 07/09/25 1500   BP:  (!) 186/82 (!) 186/82 167/74   Pulse: 60 69 55 54   Resp: 18      Temp: 97.7 °F (36.5 °C)      TempSrc: Oral      SpO2: 98% 98%  100%       Active LDAs/IV Access:   Lines, Drains & Airways       Active LDAs       Name Placement date Placement time Site Days    Peripheral IV 07/09/25 1401 18 G Left Antecubital 07/09/25  1401  Antecubital  less than 1                    Labs (abnormal labs have a star):   Labs Reviewed   COMPREHENSIVE METABOLIC PANEL - Abnormal; Notable for the following components:       Result Value    Glucose 108 (*)     All other components within normal limits    Narrative:     GFR Categories in Chronic Kidney Disease (CKD)              GFR Category          GFR (mL/min/1.73)    Interpretation  G1                    90 or greater        Normal or high (1)  G2                    60-89                Mild decrease (1)  G3a                    45-59                Mild to moderate decrease  G3b                   30-44                Moderate to severe decrease  G4                    15-29                Severe decrease  G5                    14 or less           Kidney failure    (1)In the absence of evidence of kidney disease, neither GFR category G1 or G2 fulfill the criteria for CKD.    eGFR calculation 2021 CKD-EPI creatinine equation, which does not include race as a factor   CBC WITH AUTO DIFFERENTIAL - Abnormal; Notable for the following components:    RBC 5.85 (*)     Hematocrit 52.1 (*)     Lymphocyte % 17.9 (*)     All other components within normal limits   PROTIME-INR - Normal   POCT GLUCOSE FINGERSTICK - Normal   RAINBOW DRAW    Narrative:     The following orders were created for panel order [place] Draw.  Procedure                               Abnormality         Status                     ---------                               -----------         ------                     Green Top (Gel)[[id number]]                                  Final result               Lavender Top[[id number]]                                     Final result               Gold Top - SST[[id number]]                                   Final result               Light Blue Top[[id number]]                                   Final result                 Please view results for these tests on the individual orders.   HEMOGLOBIN A1C   LIPID PANEL   APTT   POCT GLUCOSE FINGERSTICK   POCT GLUCOSE FINGERSTICK   POCT GLUCOSE FINGERSTICK   POCT GLUCOSE FINGERSTICK   POCT GLUCOSE FINGERSTICK   CBC AND DIFFERENTIAL    Narrative:     The following orders were created for panel order CBC & Differential.  Procedure                               Abnormality         Status                     ---------                               -----------         ------                     CBC Auto Differential[[id number]]        Abnormal            Final result                 Please view results for  these tests on the individual orders.   GREEN TOP   LAVENDER TOP   GOLD TOP - SST   LIGHT BLUE TOP       EKG:   ECG 12 Lead ED Triage Standing Order; Stroke (Onset >12 hrs)   Preliminary Result   HEART RATE=55  bpm   RR Qwuhmyhz=9014  ms   MN Zzdvtldi=729  ms   P Horizontal Axis=-2  deg   P Front Axis=-1  deg   QRSD Interval=95  ms   QT Wdsaxjph=547  ms   USbX=878  ms   QRS Axis=-1  deg   T Wave Axis=-3  deg   - BORDERLINE ECG -   Sinus rhythm   LVH by voltage   Borderline T abnormalities, inferior leads   When compared with ECG of 29-Jul-2013 06:39:21,   No significant change   Date and Time of Study:2025-07-09 14:30:27          Meds given in ED:   Medications   sodium chloride 0.9 % flush 10 mL (has no administration in time range)   sodium chloride 0.9 % flush 10 mL (has no administration in time range)   sodium chloride 0.9 % flush 10 mL (has no administration in time range)   sodium chloride 0.9 % infusion 40 mL (has no administration in time range)   atorvastatin (LIPITOR) tablet 40 mg (has no administration in time range)   ondansetron (ZOFRAN) injection 4 mg (has no administration in time range)   aspirin tablet 325 mg (has no administration in time range)     Or   aspirin suppository 300 mg (has no administration in time range)   losartan (COZAAR) tablet 50 mg (has no administration in time range)   labetalol (NORMODYNE,TRANDATE) injection 10 mg (10 mg Intravenous Given 7/9/25 1405)   iopamidol (ISOVUE-370) 76 % injection 100 mL (100 mL Intravenous Given 7/9/25 1558)       Imaging results:  XR Chest 1 View  Result Date: 7/9/2025  No focal pulmonary consolidation. Borderline heart size. Tortuous aorta. Follow-up as clinically indicated.  This report was finalized on 7/9/2025 2:57 PM by Dr. Cornelio Camarena M.D on Workstation: [id number]        Ambulatory status:   - assist     Social issues:   Social History     Socioeconomic History    Marital status:    Tobacco Use    Smoking status: Never      "Passive exposure: Never    Smokeless tobacco: Never   Vaping Use    Vaping status: Never Used   Substance and Sexual Activity    Alcohol use: Yes     Comment: rare    Drug use: No    Sexual activity: Yes     Partners: Female       Peripheral Neurovascular  Peripheral Neurovascular (Adult)  Peripheral Neurovascular WDL: WDL    Neuro Cognitive  Neuro Cognitive (Adult)  Cognitive/Neuro/Behavioral WDL: WDL (\"forgetful per family\" pt reports that he is confused)  Last Known Well Date: 07/09/25  Last Known Well Time: 1250  Additional Documentation: [place] Coma Scale (Group), Last Known Well Date (Row), Last Known Well Time (Row), NIH Stroke Scale (group)  [place] Coma Scale  Best Eye Response: 4-->(E4) spontaneous  Best Motor Response: 6-->(M6) obeys commands  Best Verbal Response: 5-->(V5) oriented  [place] Coma Scale Score: 15  NIH Stroke Scale  Interval: baseline  1a. Level of Consciousness: 0-->Alert, keenly responsive  1b. LOC Questions: 0-->Answers both questions correctly  1c. LOC Commands: 0-->Performs both tasks correctly  2. Best Gaze: 0-->Normal  3. Visual: 0-->No visual loss  4. Facial Palsy: 0-->Normal symmetrical movements  5a. Motor Arm, Left: 0-->No drift, limb holds 90 (or 45) degrees for full 10 secs  5b. Motor Arm, Right: 0-->No drift, limb holds 90 (or 45) degrees for full 10 secs  6a. Motor Leg, Left: 0-->No drift, leg holds 30 degree position for full 5 secs  6b. Motor Leg, Right: 0-->No drift, leg holds 30 degree position for full 5 secs  7. Limb Ataxia: 0-->Absent  8. Sensory: 0-->Normal, no sensory loss  9. Best Language: 0-->No aphasia, normal  10. Dysarthria: 0-->Normal  11. Extinction and Inattention (formerly Neglect): 0-->No abnormality  Total (NIH Stroke Scale): 0    Learning  Learning Assessment  Learning Readiness and Ability: no barriers identified    Respiratory  Respiratory WDL  Respiratory WDL: WDL    Abdominal Pain       Pain Assessments  Pain (Adult)  (0-10) Pain Rating: Rest: " 0    NIH Stroke Scale  NIH Stroke Scale  Interval: baseline  1a. Level of Consciousness: 0-->Alert, keenly responsive  1b. LOC Questions: 0-->Answers both questions correctly  1c. LOC Commands: 0-->Performs both tasks correctly  2. Best Gaze: 0-->Normal  3. Visual: 0-->No visual loss  4. Facial Palsy: 0-->Normal symmetrical movements  5a. Motor Arm, Left: 0-->No drift, limb holds 90 (or 45) degrees for full 10 secs  5b. Motor Arm, Right: 0-->No drift, limb holds 90 (or 45) degrees for full 10 secs  6a. Motor Leg, Left: 0-->No drift, leg holds 30 degree position for full 5 secs  6b. Motor Leg, Right: 0-->No drift, leg holds 30 degree position for full 5 secs  7. Limb Ataxia: 0-->Absent  8. Sensory: 0-->Normal, no sensory loss  9. Best Language: 0-->No aphasia, normal  10. Dysarthria: 0-->Normal  11. Extinction and Inattention (formerly Neglect): 0-->No abnormality  Total (NIH Stroke Scale): 0    Dang Valle RN  07/09/25 16:10 EDT

## 2025-07-09 NOTE — PROGRESS NOTES
Clinical Pharmacy Services: Medication History    Tip Pruitt is a 64 y.o. male presenting to [facility] for   Chief Complaint   Patient presents with    Altered Mental Status       He  has a past medical history of Hyperlipidemia, Hypertension, Obesity, and Sleep apnea.    Allergies as of 07/09/2025 - Reviewed 07/09/2025   Allergen Reaction Noted    Naprosyn [naproxen]  02/17/2016    Other Other (See Comments) 07/14/2017       Medication information was obtained from: Patient   Pharmacy and Phone Number:     Prior to Admission Medications       Prescriptions Last Dose Informant Patient Reported? Taking?    acetaminophen (TYLENOL) 325 MG tablet Past Month Self Yes Yes    Take 2 tablets by mouth Every 4 (Four) Hours As Needed.    aspirin 81 MG chewable tablet 7/8/2025 Self Yes Yes    Chew 1 tablet Daily.    atenolol (TENORMIN) 100 MG tablet 7/8/2025 Pharmacy, Self No Yes    Take 1 tablet by mouth Daily.    atorvastatin (LIPITOR) 40 MG tablet 7/8/2025 Pharmacy, Self Yes Yes    Take 1 tablet by mouth Daily.    ibuprofen (ADVIL,MOTRIN) 200 MG tablet Past Month Self Yes Yes    Take 1 tablet by mouth Every 6 (Six) Hours As Needed for Mild Pain.    losartan (COZAAR) 50 MG tablet 7/8/2025 Pharmacy, Self Yes Yes    Take 1 tablet by mouth Daily.    metFORMIN HCl 750 MG tablet 7/8/2025 Pharmacy, Self Yes Yes    Take 750 mg by mouth Daily With Breakfast.    sildenafil (REVATIO) 20 MG tablet  Self Yes Yes    Take 1 tablet by mouth Daily As Needed.    tamsulosin (FLOMAX) 0.4 MG capsule 24 hr capsule 7/8/2025 Pharmacy, Self Yes Yes    Take 1 capsule by mouth Daily.              Medication notes:     This medication list is complete to the best of my knowledge as of 7/9/2025    Please call if questions.    Dennis Gleason  Medication History Technician  [phone]    7/9/2025 14:46 EDT

## 2025-07-09 NOTE — ED TRIAGE NOTES
Pt was brought in by family for ams that started 1hr ago. Family states that he doesn't remember the last 2wks.

## 2025-07-09 NOTE — ED NOTES
Nursing report ED to floor  Tip Pruitt  64 y.o.  male    HPI :  HPI  Stated Reason for Visit: ams x1hr    Chief Complaint  Chief Complaint   Patient presents with    Altered Mental Status       Admitting doctor:   Matias Eugene MD    Admitting diagnosis:   The encounter diagnosis was TGA (transient global amnesia).    Code status:   Current Code Status       Date Active Code Status Order ID Comments User Context       7/9/2025 1604 CPR (Attempt to Resuscitate) [id number]  Kiley Levy APRN ED        Question Answer    Code Status (Patient has no pulse and is not breathing) CPR (Attempt to Resuscitate)    Medical Interventions (Patient has pulse or is breathing) Full Support    Level Of Support Discussed With Patient                    Allergies:   Naprosyn [naproxen] and Other    Isolation:   No active isolations    Intake and Output  No intake or output data in the 24 hours ending 07/09/25 1636    Weight:   There were no vitals filed for this visit.    Most recent vitals:   Vitals:    07/09/25 1400 07/09/25 1405 07/09/25 1500 07/09/25 1630   BP: (!) 186/82 (!) 186/82 167/74 166/75   Pulse: 69 55 54 55   Resp:       Temp:       TempSrc:       SpO2: 98%  100% 99%       Active LDAs/IV Access:   Lines, Drains & Airways       Active LDAs       Name Placement date Placement time Site Days    Peripheral IV 07/09/25 1401 18 G Left Antecubital 07/09/25  1401  Antecubital  less than 1                    Labs (abnormal labs have a star):   Labs Reviewed   COMPREHENSIVE METABOLIC PANEL - Abnormal; Notable for the following components:       Result Value    Glucose 108 (*)     All other components within normal limits    Narrative:     GFR Categories in Chronic Kidney Disease (CKD)              GFR Category          GFR (mL/min/1.73)    Interpretation  G1                    90 or greater        Normal or high (1)  G2                    60-89                Mild decrease (1)  G3a                   45-59                 Mild to moderate decrease  G3b                   30-44                Moderate to severe decrease  G4                    15-29                Severe decrease  G5                    14 or less           Kidney failure    (1)In the absence of evidence of kidney disease, neither GFR category G1 or G2 fulfill the criteria for CKD.    eGFR calculation 2021 CKD-EPI creatinine equation, which does not include race as a factor   CBC WITH AUTO DIFFERENTIAL - Abnormal; Notable for the following components:    RBC 5.85 (*)     Hematocrit 52.1 (*)     Lymphocyte % 17.9 (*)     All other components within normal limits   HEMOGLOBIN A1C - Abnormal; Notable for the following components:    Hemoglobin A1C 6.10 (*)     All other components within normal limits    Narrative:     Hemoglobin A1C Ranges:    Increased Risk for Diabetes  5.7% to 6.4%  Diabetes                     >= 6.5%  Diabetic Goal                < 7.0%   PROTIME-INR - Normal   APTT - Normal   POCT GLUCOSE FINGERSTICK - Normal   RAINBOW DRAW    Narrative:     The following orders were created for panel order [place] Draw.  Procedure                               Abnormality         Status                     ---------                               -----------         ------                     Green Top (Gel)[[id number]]                                  Final result               Lavender Top[[id number]]                                     Final result               Gold Top - SST[[id number]]                                   Final result               Light Blue Top[[id number]]                                   Final result                 Please view results for these tests on the individual orders.   LIPID PANEL    Narrative:     Cholesterol Reference Ranges  (U.S. Department of Health and Human Services ATP III Classifications)    Desirable          <200 mg/dL  Borderline High    200-239 mg/dL  High Risk          >240 mg/dL      Triglyceride Reference Ranges  (U.S.  Department of Health and Human Services ATP III Classifications)    Normal           <150 mg/dL  Borderline High  150-199 mg/dL  High             200-499 mg/dL  Very High        >500 mg/dL    HDL Reference Ranges  (U.S. Department of Health and Human Services ATP III Classifications)    Low     <40 mg/dl (major risk factor for CHD)  High    >60 mg/dl ('negative' risk factor for CHD)        LDL Reference Ranges  (U.S. Department of Health and Human Services ATP III Classifications)    Optimal          <100 mg/dL  Near Optimal     100-129 mg/dL  Borderline High  130-159 mg/dL  High             160-189 mg/dL  Very High        >189 mg/dL    LDL is calculated using the NIH LDL-C calculation.     URINALYSIS W/ CULTURE IF INDICATED   POCT GLUCOSE FINGERSTICK   POCT GLUCOSE FINGERSTICK   POCT GLUCOSE FINGERSTICK   POCT GLUCOSE FINGERSTICK   POCT GLUCOSE FINGERSTICK   POCT GLUCOSE FINGERSTICK   POCT GLUCOSE FINGERSTICK   POCT GLUCOSE FINGERSTICK   POCT GLUCOSE FINGERSTICK   CBC AND DIFFERENTIAL    Narrative:     The following orders were created for panel order CBC & Differential.  Procedure                               Abnormality         Status                     ---------                               -----------         ------                     CBC Auto Differential[[id number]]        Abnormal            Final result                 Please view results for these tests on the individual orders.   GREEN TOP   LAVENDER TOP   GOLD TOP - SST   LIGHT BLUE TOP       EKG:   ECG 12 Lead ED Triage Standing Order; Stroke (Onset >12 hrs)   Preliminary Result   HEART RATE=55  bpm   RR Pesmggks=4479  ms   SD Rkuelhvi=239  ms   P Horizontal Axis=-2  deg   P Front Axis=-1  deg   QRSD Interval=95  ms   QT Bwifdcsa=426  ms   LGbP=201  ms   QRS Axis=-1  deg   T Wave Axis=-3  deg   - BORDERLINE ECG -   Sinus rhythm   LVH by voltage   Borderline T abnormalities, inferior leads   When compared with ECG of 29-Jul-2013 06:39:21,   No  significant change   Date and Time of Study:2025-07-09 14:30:27          Meds given in ED:   Medications   sodium chloride 0.9 % flush 10 mL (has no administration in time range)   sodium chloride 0.9 % flush 10 mL (has no administration in time range)   sodium chloride 0.9 % flush 10 mL (has no administration in time range)   sodium chloride 0.9 % infusion 40 mL (has no administration in time range)   atorvastatin (LIPITOR) tablet 40 mg (has no administration in time range)   ondansetron (ZOFRAN) injection 4 mg (has no administration in time range)   aspirin tablet 325 mg (has no administration in time range)     Or   aspirin suppository 300 mg (has no administration in time range)   losartan (COZAAR) tablet 50 mg (has no administration in time range)   dextrose (GLUTOSE) oral gel 15 g (has no administration in time range)   dextrose (D50W) (25 g/50 mL) IV injection 25 g (has no administration in time range)   glucagon (GLUCAGEN) injection 1 mg (has no administration in time range)   insulin lispro (HUMALOG/ADMELOG) injection 2-9 Units (has no administration in time range)   labetalol (NORMODYNE,TRANDATE) injection 10 mg (10 mg Intravenous Given 7/9/25 1405)   iopamidol (ISOVUE-370) 76 % injection 100 mL (100 mL Intravenous Given 7/9/25 1558)       Imaging results:  CT Angiogram Head w AI Analysis of LVO  Result Date: 7/9/2025  1. No evidence for acute intracranial abnormality or large vessel occlusion. 2. Atherosclerotic calcifications are present with calcified plaque towards the right vertebral artery with moderate stenosis. Calcification associated with the intracranial segment left vertebral artery with mild narrowing. 3. Multilevel endplate spur formation within the cervical spine. 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  Three-dimensional volume rendering is being obtained and will be reviewed prior to signature of this report.      CT Angiogram Neck  Result Date:  "7/9/2025  1. No evidence for acute intracranial abnormality or large vessel occlusion. 2. Atherosclerotic calcifications are present with calcified plaque towards the right vertebral artery with moderate stenosis. Calcification associated with the intracranial segment left vertebral artery with mild narrowing. 3. Multilevel endplate spur formation within the cervical spine. 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  Three-dimensional volume rendering is being obtained and will be reviewed prior to signature of this report.      XR Chest 1 View  Result Date: 7/9/2025  No focal pulmonary consolidation. Borderline heart size. Tortuous aorta. Follow-up as clinically indicated.  This report was finalized on 7/9/2025 2:57 PM by Dr. Cornelio Camarena M.D on Workstation: [employer]        Ambulatory status:   - ambulates ad denzel at home    Social issues:   Social History     Socioeconomic History    Marital status:    Tobacco Use    Smoking status: Never     Passive exposure: Never    Smokeless tobacco: Never   Vaping Use    Vaping status: Never Used   Substance and Sexual Activity    Alcohol use: Yes     Comment: rare    Drug use: No    Sexual activity: Yes     Partners: Female       Peripheral Neurovascular  Peripheral Neurovascular (Adult)  Peripheral Neurovascular WDL: WDL    Neuro Cognitive  Neuro Cognitive (Adult)  Cognitive/Neuro/Behavioral WDL: WDL (\"forgetful per family\" pt reports that he is confused)  Last Known Well Date: 07/09/25  Last Known Well Time: 1250  Additional Documentation: [place] Coma Scale (Group), Last Known Well Date (Row), Last Known Well Time (Row), NIH Stroke Scale (group)  [place] Coma Scale  Best Eye Response: 4-->(E4) spontaneous  Best Motor Response: 6-->(M6) obeys commands  Best Verbal Response: 5-->(V5) oriented  [place] Coma Scale Score: 15  NIH Stroke Scale  Interval: baseline  1a. Level of Consciousness: 0-->Alert, keenly responsive  1b. LOC " Questions: 0-->Answers both questions correctly  1c. LOC Commands: 0-->Performs both tasks correctly  2. Best Gaze: 0-->Normal  3. Visual: 0-->No visual loss  4. Facial Palsy: 0-->Normal symmetrical movements  5a. Motor Arm, Left: 0-->No drift, limb holds 90 (or 45) degrees for full 10 secs  5b. Motor Arm, Right: 0-->No drift, limb holds 90 (or 45) degrees for full 10 secs  6a. Motor Leg, Left: 0-->No drift, leg holds 30 degree position for full 5 secs  6b. Motor Leg, Right: 0-->No drift, leg holds 30 degree position for full 5 secs  7. Limb Ataxia: 0-->Absent  8. Sensory: 0-->Normal, no sensory loss  9. Best Language: 0-->No aphasia, normal  10. Dysarthria: 0-->Normal  11. Extinction and Inattention (formerly Neglect): 0-->No abnormality  Total (NIH Stroke Scale): 0    Learning  Learning Assessment  Learning Readiness and Ability: no barriers identified    Respiratory  Respiratory WDL  Respiratory WDL: WDL    Abdominal Pain       Pain Assessments  Pain (Adult)  (0-10) Pain Rating: Rest: 0    NIH Stroke Scale  NIH Stroke Scale  Interval: baseline  1a. Level of Consciousness: 0-->Alert, keenly responsive  1b. LOC Questions: 0-->Answers both questions correctly  1c. LOC Commands: 0-->Performs both tasks correctly  2. Best Gaze: 0-->Normal  3. Visual: 0-->No visual loss  4. Facial Palsy: 0-->Normal symmetrical movements  5a. Motor Arm, Left: 0-->No drift, limb holds 90 (or 45) degrees for full 10 secs  5b. Motor Arm, Right: 0-->No drift, limb holds 90 (or 45) degrees for full 10 secs  6a. Motor Leg, Left: 0-->No drift, leg holds 30 degree position for full 5 secs  6b. Motor Leg, Right: 0-->No drift, leg holds 30 degree position for full 5 secs  7. Limb Ataxia: 0-->Absent  8. Sensory: 0-->Normal, no sensory loss  9. Best Language: 0-->No aphasia, normal  10. Dysarthria: 0-->Normal  11. Extinction and Inattention (formerly Neglect): 0-->No abnormality  Total (NIH Stroke Scale): 0    Kyleigh Benavidez RN  07/09/25 16:36  EDT

## 2025-07-09 NOTE — PLAN OF CARE
Goal Outcome Evaluation: pt admitted for TGA. Pt is A/O x4 but is unable to recall recent events from today. Family at bedside. Pt MRI pending and planning to do EEG. Swallow screen passed. Pt blood sugar 82. Pt felt lightheaded but had not ate since breakfast that morning. Pt was given tray and is resting comfortably. NIH 0. Pt has call light within reach and has no further questions at this time. Transfer of care to GARY Cerrato.       Problem: Adult Inpatient Plan of Care  Goal: Plan of Care Review  7/9/2025 1924 by Susu Zamora RN  Outcome: Progressing  7/9/2025 1728 by Susu Zamora RN  Outcome: Progressing  Goal: Patient-Specific Goal (Individualized)  7/9/2025 1924 by Susu Zamora RN  Outcome: Progressing  7/9/2025 1728 by Susu Zamora RN  Outcome: Progressing  Goal: Absence of Hospital-Acquired Illness or Injury  7/9/2025 1924 by Susu Zamora RN  Outcome: Progressing  7/9/2025 1728 by Susu Zamora RN  Outcome: Progressing  Intervention: Identify and Manage Fall Risk  Recent Flowsheet Documentation  Taken 7/9/2025 1824 by Susu Zamora RN  Safety Promotion/Fall Prevention:   room organization consistent   safety round/check completed  Intervention: Prevent Skin Injury  Recent Flowsheet Documentation  Taken 7/9/2025 1824 by Susu Zamora RN  Body Position: position changed independently  Intervention: Prevent and Manage VTE (Venous Thromboembolism) Risk  Recent Flowsheet Documentation  Taken 7/9/2025 1824 by Susu Zamora RN  VTE Prevention/Management:   SCDs (sequential compression devices) off   patient refused intervention  Intervention: Prevent Infection  Recent Flowsheet Documentation  Taken 7/9/2025 1824 by Susu aZmora RN  Infection Prevention: single patient room provided  Goal: Optimal Comfort and Wellbeing  7/9/2025 1924 by Susu Zamora RN  Outcome: Progressing  7/9/2025 1728 by Susu aZmora RN  Outcome: Progressing  Intervention: Monitor Pain and  Promote Comfort  Recent Flowsheet Documentation  Taken 7/9/2025 1824 by Susu Zamora, RN  Pain Management Interventions: (pt thinks he has a headache  due to not eating) --  Intervention: Provide Person-Centered Care  Recent Flowsheet Documentation  Taken 7/9/2025 1824 by Susu Zamora RN  Trust Relationship/Rapport:   care explained   choices provided  Goal: Readiness for Transition of Care  7/9/2025 1924 by Susu Zamora, RN  Outcome: Progressing  7/9/2025 1728 by Susu Zamora RN  Outcome: Progressing  Intervention: Mutually Develop Transition Plan  Recent Flowsheet Documentation  Taken 7/9/2025 1731 by Susu Zamora, RN  Transportation Anticipated:   family or friend will provide   car, drives self  Patient/Family Anticipated Services at Transition: none  Patient/Family Anticipates Transition to: home with family  Taken 7/9/2025 1729 by Susu Zamora, RN  Equipment Currently Used at Home: none

## 2025-07-09 NOTE — CONSULTS
Neurology Consult Note    Consult Date: 7/9/2025    Referring MD: No ref. provider found    Reason for Consult I have been asked to see the patient in neurological consultation to render advice and opinion regarding acute altered mental status.    Tip Pruitt is a 64 y.o. 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 Khoury neurologist at [place] presents the ED with acute altered mental status.  Patient was apparently doing some yard work this morning and came back and showered and went to sit down in his office.  Wife said she did not see him since he was at his office and had been several minutes when she went to check on him around noon.  Patient was asking repetitive questions not know what he was doing in his office.  He cannot recall anything that happened over the last 2 weeks.  He asked repetitive question every couple minutes.  Patient admits to a mild left frontal headache that is pressure-like.  He denies associated light or sound sensitivity.  No nausea or vomiting.  No visual disturbance, facial droop, unilateral weakness/numbness, dizziness, gait imbalance.  When asked if patient has been under stress he said who has not but wife quickly dismisses and says that he has not been under much stress.  Not been recently sick or any head trauma that he is aware of.  Blood pressure arrival 186/82 mmHg and pulse 60 bpm. No prior history of seizures or family history of seizures.  Never woke up in the middle of the night with convulsions, urinary or bowel incontinence or tongue bite.    Past Medical/Surgical Hx:  Past Medical History:   Diagnosis Date    Hyperlipidemia     Hypertension     Obesity     Sleep apnea      Past Surgical History:   Procedure Laterality Date    COLONOSCOPY  08/14/2012    FINGER SURGERY  05/1967    PROSTATE  BIOPSY  10/2012    REPLACEMENT TOTAL KNEE Left 08/2013    REPLACEMENT TOTAL KNEE  07/2011    VASECTOMY         Medications On Admission  (Not in a hospital admission)      Allergies:  Allergies   Allergen Reactions    Naprosyn [Naproxen]     Other Other (See Comments)     Glue from bandaid makes him break out       Social Hx:  Social History     Socioeconomic History    Marital status:    Tobacco Use    Smoking status: Never     Passive exposure: Never    Smokeless tobacco: Never   Vaping Use    Vaping status: Never Used   Substance and Sexual Activity    Alcohol use: Yes     Comment: rare    Drug use: No    Sexual activity: Yes     Partners: Female       Family Hx:  Family History   Problem Relation Age of Onset    Diabetes type II Father     Pancreatic cancer Father     Diabetes type II Brother     Glaucoma Maternal Grandmother     Stroke Maternal Grandmother     Alcohol abuse Maternal Grandfather     Diabetes type II Paternal Grandfather     Heart disease Paternal Grandfather          Exam    BP (!) 186/82   Pulse 55   Temp 97.7 °F (36.5 °C) (Oral)   Resp 18   SpO2 98%   gen: NAD, vitals reviewed  MS: oriented to self, age, place, guesses month is June and year is 2020, not oriented to situation, recent/remote memory impaired, as repetitive questioning every couple minutes, normal attention/concentration, language intact, no neglect  CN: visual acuity grossly normal, visual fields full, PERRL, EOMI, facial sensation equal, no facial droop, hearing symmetric, palate elevates symmetrically, shoulder shrug equal, tongue midline  Motor: 5/5 throughout upper and lower extremities, normal tone  Sensation: intact to light touch throughout  Coordination: no dysmetria with finger to nose bilaterally    DATA:    Lab Results   Component Value Date    CALCIUM 9.4 09/20/2022     09/20/2022    K 4.2 09/20/2022    CO2 26 09/20/2022     09/20/2022    BUN 16 09/20/2022    CREATININE 0.96 09/20/2022     "EGFRIFAFRI >60 09/20/2022    BCR 16.1 09/20/2022    ANIONGAP 9 09/20/2022     Lab Results   Component Value Date    WBC 5.82 07/09/2025    HGB 16.9 07/09/2025    HCT 52.1 (H) 07/09/2025    MCV 89.1 07/09/2025     07/09/2025     Lab Results   Component Value Date    LDL 76 08/10/2020    LDL 92 07/20/2018     Lab Results   Component Value Date    HGBA1C 6.4 (H) 08/20/2024     No results found for: \"INR\", \"PROTIME\"    Lab review:   Blood glucose 97  CMP and CBC reviewed  A1c: 6.1  LDL 77    Imaging review:   CTA head and neck with and without contrast:  FINDINGS: Noncontrasted head CT demonstrates no abnormal area of  increased attenuation intra axially to suggest hemorrhage. No  extra-axial fluid collection is observed. There is no evidence for  cerebral edema or mass effect or shift of the midline structures.  Atherosclerotic calcifications are present involving the thoracic aorta.  Great vessel origins are patent. There are calcified plaques involving  the right carotid bulb both proximal ICAs without NASCET stenosis.  Atherosclerotic calcifications are present involving the cavernous and  supracavernous ICAs with up to mild narrowing. Both middle cerebral  arteries and intracerebral arteries are patent.  Calcified plaque origin right vertebral artery with moderate stenosis.  Limited visualization of the proximal left vertebral artery associated  with adjacent venous contrast. Calcified plaque intracranial segment  left vertebral artery with mild narrowing. Basilar artery, both  posterior cerebral arteries are patent.  Postcontrast enhanced head CT demonstrates no abnormal intracranial  enhancement.  There is multilevel exuberant endplate spur formation within the  thoracic spine compatible with DISH. 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     IMPRESSION:  1. No evidence for acute intracranial abnormality or large vessel  occlusion.  2. Atherosclerotic " calcifications are present with calcified plaque  towards the right vertebral artery with moderate stenosis. Calcification  associated with the intracranial segment left vertebral artery with mild  narrowing.  3. Multilevel endplate spur formation within the cervical spine.  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    CXR:  FINDINGS: The heart size is borderline. Pulmonary vasculature is  unremarkable. Aorta appears tortuous. No focal pulmonary consolidation,  pleural effusion, or pneumothorax. No acute osseous process.  IMPRESSION:  No focal pulmonary consolidation. Borderline heart size.  Tortuous aorta. Follow-up as clinically indicated.    Diagnoses:  Acute altered mental status  Hypertension  Hyperlipidemia  Obstructive sleep apnea admits to compliance with CPAP  Morbid obesity    Comment: Patient constellation symptoms he was most consistent with TGA.  He had fairly sudden onset of confusion with repetition of questions every few minutes or so.  Typical with TGA due to hippocampal involvement.  If this is the case, memory should improve in about 6 to 8 hours.  He is alert and oriented to self, age and place only.  However this has been fluctuating per nursing staff.  He has no focal deficits.  No prior history of seizures.  Never woke up in the middle of the night with convulsions, urinary or bowel incontinence or tongue bite.  Could possibly provoked by yard work patient did this morning.  He admits to compliance with his CPAP.  No recent head trauma.  He states he has been under some stress but wife disagrees with this. CTH without demonstrates no acute abnormality. CTA head and neck shows no acute LVO or severe stenosis. There are atherosclerotic calcifications present including right vert with moderate stenosis and left vert with mild stenosis.  Will also obtain routine EEG and MRI brain with and without contrast to further investigate.    PLAN:   CTA head and neck  stat  Admit to observation unit  EEG  MRI brain with and without contrast  Infectious workup per primary care team including checking urinalysis.  Continuous telemetry  -Admit to 5th floor  -Maintain permissive HTN for 24-48hrs, do not treat unless BP > 220/120 if no contraindication  -Perform bedside swallow test  -Telemetry to monitor for arrhythmia  -VTE prophylaxis  -Neuro checks, if change in exam call neuro oncall    Recommendations discussed with Dr. Gaspar who is in agreement with plan.

## 2025-07-09 NOTE — H&P
[facility]   HISTORY AND PHYSICAL    Patient Name: Tip Pruitt  : 1960  MRN: [id number]  Primary Care Physician:  Melissa Soares MD  Date of admission: 2025    Subjective   Subjective     Chief Complaint:   Chief Complaint   Patient presents with   • Altered Mental Status         HPI:    Tip Pruitt is a pleasant afebrile 64 y.o.  male with a past medical history of hypertension, hyperlipidemia and sleep apnea.     He presents to the emergency department at [facility] today with complaint of altered mental status.  He has been admitted to the ED observation unit for further testing and evaluation.    Patient is accompanied to the ED by his wife.  She reports that he was in his normal state of health this morning when he got up to water the flowers and was acting normal when he came in to eat breakfast, sometime this morning patient became forgetful of what he did over the last 24 hours and will have repetitive questioning about every 2 minutes or so with curiosity regarding what brought him to the ED.    Patient reports he has been feeling well the last few days.  He has not had any changes to his home medications.  He has not been exposed to any sick contacts.  He has not had any fevers or chills.  He is not having any nausea, vomiting or diarrheal symptoms.  At present he denies any complaints.    Review of Systems   All systems were reviewed and negative except for: What is mentioned above    Personal History     Past Medical History:   Diagnosis Date   • Hyperlipidemia    • Hypertension    • Obesity    • Sleep apnea        Past Surgical History:   Procedure Laterality Date   • COLONOSCOPY  2012   • FINGER SURGERY  1967   • PROSTATE BIOPSY  10/2012   • REPLACEMENT TOTAL KNEE Left 2013   • REPLACEMENT TOTAL KNEE  2011   • VASECTOMY         Family History: family history includes Alcohol abuse in his maternal grandfather; Diabetes type II in  his brother, father, and paternal grandfather; Glaucoma in his maternal grandmother; Heart disease in his paternal grandfather; Pancreatic cancer in his father; Stroke in his maternal grandmother. Otherwise pertinent FHx was reviewed and not pertinent to current issue.    Social History:  reports that he has never smoked. He has never been exposed to tobacco smoke. He has never used smokeless tobacco. He reports current alcohol use. He reports that he does not use drugs.    Home Medications:  acetaminophen, aspirin, atenolol, atorvastatin, ibuprofen, losartan, metFORMIN HCl, sildenafil, and tamsulosin    Allergies:  Allergies   Allergen Reactions   • Naprosyn [Naproxen]    • Other Other (See Comments)     Glue from bandaid makes him break out       Objective   Objective     Vitals:   Temp:  [97.7 °F (36.5 °C)] 97.7 °F (36.5 °C)  Heart Rate:  [54-69] 54  Resp:  [18] 18  BP: (167-186)/(74-82) 167/74  Physical Exam    Constitutional: Awake, alert   Eyes: PERRLA, sclerae anicteric, no conjunctival injection   HENT: NCAT, mucous membranes moist   Neck: Supple, no thyromegaly, no lymphadenopathy, trachea midline   Respiratory: Clear to auscultation bilaterally, nonlabored respirations    Cardiovascular: RRR, no murmurs, rubs, or gallops, palpable pedal pulses bilaterally   Gastrointestinal: Positive bowel sounds, soft, nontender, obese   Musculoskeletal: No bilateral ankle edema, no clubbing or cyanosis to extremities   Psychiatric: Appropriate affect, cooperative   Neurologic: Patient is intermittently oriented x 3 and then at times will be confused to time and place with repetitive questioning about every 2 minutes,, strength symmetric in all extremities, no facial droop, dysarthria, aphasia or other appreciable neurodeficits, speech clear   Skin: No rashes     Result Review    Result Review:  I have personally reviewed the results from the time of this admission to 7/9/2025 16:08 EDT and agree with these findings:  [x]   Laboratory list / accordion  []  Microbiology  [x]  Radiology  []  EKG/Telemetry   []  Cardiology/Vascular   []  Pathology  [x]  Old records  []  Other:  Most notable findings include: Blood glucose 108, chest x-ray shows no acute cardiopulmonary findings      Assessment & Plan   The ASCVD Risk score (Corrie SNELL, et al., 2019) failed to calculate for the following reasons:    Cannot find a previous HDL lab    Cannot find a previous total cholesterol lab    Assessment / Plan     Brief Patient Summary:  Tip Pruitt is a 64 y.o. male who is being evaluated for altered mental status and repetitive questioning consistent with transient global amnesia.    Active Hospital Problems:  Active Hospital Problems    Diagnosis    • **Transient global amnesia      Plan:     Altered mental status  Suspect transient global amnesia  CTA head neck radiology report pending  Consult to neurology  MRI brain pending  Echocardiogram pending  Aspirin/statin  Lipid panel/A1c    Hypertension, poorly controlled  Increase losartan to 50 mg twice daily  Labetalol 10 mg IV given in ED  Vital signs every 4 hours    Type 2 diabetes with hyperglycemia  Hold metformin for 72 hours following administration of CT contrast  Low-moderate correctional insulin sliding scale protocol initiated    VTE Prophylaxis:  Mechanical VTE prophylaxis orders are present.        CODE STATUS:    Code Status (Patient has no pulse and is not breathing): CPR (Attempt to Resuscitate)  Medical Interventions (Patient has pulse or is breathing): Full Support  Level Of Support Discussed With: Patient    Admission Status:  I believe this patient meets observation status.    Electronically signed by MISSY Clemons, 07/09/25, 3:07 PM EDT.      79 minutes has been spent by [facility] providers in the care of this patient while under observation status on this date 07/09/25    I have worn appropriate PPE during this patient encounter,  sanitized my hands both with entering and exiting patient's room.    I have discussed plan of care with patient including advance care plan and/or surrogate decision maker.  Patient advises that their wife Kait will be their primary surrogate decision maker

## 2025-07-09 NOTE — ED PROVIDER NOTES
EMERGENCY DEPARTMENT ENCOUNTER  Room Number:  26/26  PCP: Melissa Soares MD  Independent Historians: Patient and Family      HPI:  Chief Complaint: had concerns including Altered Mental Status.   A complete HPI/ROS/PMH/PSH/SH/FH are unobtainable due to: Altered Mental Status    Context: Tip Pruitt is a 64 y.o. male with a medical history of hypertension, hyperlipidemia who presents to the ED c/o acute loss of memory.  Family reports within the last hour the patient lost all memory of the last 2 weeks.  They report he has had repetitive questioning.  He has not had any fall or injury.  He has not been ill recently.  He has never had similar episodes.  They report that he does not remember what he did this weekend or this morning.  Patient denies any pain and denies complaints.      Review of prior external notes (non-ED) -and- Review of prior external test results outside of this encounter: Laboratory evaluation 8/20/2024 shows normal CBC    Prescription drug monitoring program review:         PAST MEDICAL HISTORY  Active Ambulatory Problems     Diagnosis Date Noted    Hyperlipidemia 02/17/2016    Hypertension 02/17/2016    Displacement of lumbar intervertebral disc without myelopathy 08/29/2016     Resolved Ambulatory Problems     Diagnosis Date Noted    No Resolved Ambulatory Problems     Past Medical History:   Diagnosis Date    Obesity     Sleep apnea          PAST SURGICAL HISTORY  Past Surgical History:   Procedure Laterality Date    COLONOSCOPY  08/14/2012    FINGER SURGERY  05/1967    PROSTATE BIOPSY  10/2012    REPLACEMENT TOTAL KNEE Left 08/2013    REPLACEMENT TOTAL KNEE  07/2011    VASECTOMY           FAMILY HISTORY  Family History   Problem Relation Age of Onset    Diabetes type II Father     Pancreatic cancer Father     Diabetes type II Brother     Glaucoma Maternal Grandmother     Stroke Maternal Grandmother     Alcohol abuse Maternal Grandfather     Diabetes type II Paternal Grandfather      Heart disease Paternal Grandfather          SOCIAL HISTORY  Social History     Socioeconomic History    Marital status:    Tobacco Use    Smoking status: Never     Passive exposure: Never    Smokeless tobacco: Never   Vaping Use    Vaping status: Never Used   Substance and Sexual Activity    Alcohol use: Yes     Comment: rare    Drug use: No    Sexual activity: Yes     Partners: Female       Chronic or social conditions impacting patient care (Social Determinants of Health):  Social Drivers of Health     Tobacco Use: Low Risk  (5/30/2025)    Received from [facility]    Patient History     Smoking Tobacco Use: Never     Smokeless Tobacco Use: Never     Passive Exposure: Not on file   Alcohol Use: Not on file   Financial Resource Strain: Not on file   Food Insecurity: Not on file   Transportation Needs: Not on file   Physical Activity: Not on file   Stress: Not on file   Social Connections: Not on file   Interpersonal Safety: Not At Risk (7/9/2025)    Abuse Screen     Unsafe at Home or Work/School: no     Feels Threatened by Someone?: no     Does Anyone Keep You from Contacting Others or Doint Things Outside the Home?: no     Physical Sign of Abuse Present: no   Depression: Not on file   Housing Stability: Not on file   Utilities: Not on file   Health Literacy: Not on file   Employment: Not on file   Disabilities: Not on file       ALLERGIES  Naprosyn [naproxen] and Other      REVIEW OF SYSTEMS  Review of Systems  Included in HPI  All systems reviewed and negative except for those discussed in HPI.      PHYSICAL EXAM    I have reviewed the triage vital signs and nursing notes.    ED Triage Vitals   Temp Heart Rate Resp BP SpO2   07/09/25 1351 07/09/25 1351 07/09/25 1351 07/09/25 1400 07/09/25 1351   97.7 °F (36.5 °C) 60 18 (!) 186/82 98 %      Temp src Heart Rate Source Patient Position BP Location FiO2 (%)   07/09/25 1351 07/09/25 1351 -- -- --   Oral Monitor          Physical Exam  GENERAL:  Awake, alert, no acute distress  SKIN: Warm, dry  HENT: Normocephalic, atraumatic  EYES: no scleral icterus  CV: regular rhythm, regular rate  RESPIRATORY: normal effort, lungs clear  ABDOMEN: soft, nontender, nondistended  MUSCULOSKELETAL: no deformity  NEURO: alert, moves all extremities, follows commands, cranial nerves II through XII intact.  Equal upper and lower extremity strength and sensation.  Normal heel-to-shin bilaterally.  Normal finger-nose bilaterally.  Normal speech.            LAB RESULTS  Recent Results (from the past 24 hours)   POC Glucose Once    Collection Time: 07/09/25  2:00 PM    Specimen: Blood   Result Value Ref Range    Glucose 97 70 - 130 mg/dL   Comprehensive Metabolic Panel    Collection Time: 07/09/25  2:04 PM    Specimen: Blood   Result Value Ref Range    Glucose 108 (H) 65 - 99 mg/dL    BUN 18.0 8.0 - 23.0 mg/dL    Creatinine 1.14 0.76 - 1.27 mg/dL    Sodium 141 136 - 145 mmol/L    Potassium 4.3 3.5 - 5.2 mmol/L    Chloride 106 98 - 107 mmol/L    CO2 25.6 22.0 - 29.0 mmol/L    Calcium 9.7 8.6 - 10.5 mg/dL    Total Protein 7.3 6.0 - 8.5 g/dL    Albumin 3.9 3.5 - 5.2 g/dL    ALT (SGPT) 21 1 - 41 U/L    AST (SGOT) 24 1 - 40 U/L    Alkaline Phosphatase 52 39 - 117 U/L    Total Bilirubin 0.7 0.0 - 1.2 mg/dL    Globulin 3.4 gm/dL    A/G Ratio 1.1 g/dL    BUN/Creatinine Ratio 15.8 7.0 - 25.0    Anion Gap 9.4 5.0 - 15.0 mmol/L    eGFR 71.8 >60.0 mL/min/1.73   Protime-INR    Collection Time: 07/09/25  2:04 PM    Specimen: Blood   Result Value Ref Range    Protime 13.5 11.7 - 14.2 Seconds    INR 1.04 0.90 - 1.10   CBC Auto Differential    Collection Time: 07/09/25  2:04 PM    Specimen: Blood   Result Value Ref Range    WBC 5.82 3.40 - 10.80 10*3/mm3    RBC 5.85 (H) 4.14 - 5.80 10*6/mm3    Hemoglobin 16.9 13.0 - 17.7 g/dL    Hematocrit 52.1 (H) 37.5 - 51.0 %    MCV 89.1 79.0 - 97.0 fL    MCH 28.9 26.6 - 33.0 pg    MCHC 32.4 31.5 - 35.7 g/dL    RDW 13.9 12.3 - 15.4 %    RDW-SD 44.9 37.0 - 54.0  fl    MPV 10.0 6.0 - 12.0 fL    Platelets 188 140 - 450 10*3/mm3    Neutrophil % 69.1 42.7 - 76.0 %    Lymphocyte % 17.9 (L) 19.6 - 45.3 %    Monocyte % 10.1 5.0 - 12.0 %    Eosinophil % 1.9 0.3 - 6.2 %    Basophil % 0.7 0.0 - 1.5 %    Immature Grans % 0.3 0.0 - 0.5 %    Neutrophils, Absolute 4.02 1.70 - 7.00 10*3/mm3    Lymphocytes, Absolute 1.04 0.70 - 3.10 10*3/mm3    Monocytes, Absolute 0.59 0.10 - 0.90 10*3/mm3    Eosinophils, Absolute 0.11 0.00 - 0.40 10*3/mm3    Basophils, Absolute 0.04 0.00 - 0.20 10*3/mm3    Immature Grans, Absolute 0.02 0.00 - 0.05 10*3/mm3    nRBC 0.0 0.0 - 0.2 /100 WBC   Green Top (Gel)    Collection Time: 07/09/25  2:04 PM   Result Value Ref Range    Extra Tube Hold for add-ons.    Lavender Top    Collection Time: 07/09/25  2:04 PM   Result Value Ref Range    Extra Tube hold for add-on    Gold Top - SST    Collection Time: 07/09/25  2:04 PM   Result Value Ref Range    Extra Tube Hold for add-ons.    Light Blue Top    Collection Time: 07/09/25  2:04 PM   Result Value Ref Range    Extra Tube Hold for add-ons.    ECG 12 Lead ED Triage Standing Order; Stroke (Onset >12 hrs)    Collection Time: 07/09/25  2:30 PM   Result Value Ref Range    QT Interval 464 ms    QTC Interval 445 ms         RADIOLOGY  XR Chest 1 View  Result Date: 7/9/2025  XR CHEST 1 VW-  HISTORY: Male who is 64 years-old, stroke protocol  TECHNIQUE: Frontal view of the chest  COMPARISON: None available  FINDINGS: The heart size is borderline. Pulmonary vasculature is unremarkable. Aorta appears tortuous. No focal pulmonary consolidation, pleural effusion, or pneumothorax. No acute osseous process.      No focal pulmonary consolidation. Borderline heart size. Tortuous aorta. Follow-up as clinically indicated.  This report was finalized on 7/9/2025 2:57 PM by Dr. Cornelio Camarena M.D on Workstation: [id number]          MEDICATIONS GIVEN IN ER  Medications   sodium chloride 0.9 % flush 10 mL (has no administration in time range)    labetalol (NORMODYNE,TRANDATE) injection 10 mg (10 mg Intravenous Given 7/9/25 1405)         ORDERS PLACED DURING THIS VISIT:  Orders Placed This Encounter   Procedures    XR Chest 1 View    CT Angiogram Head w AI Analysis of LVO    CT Angiogram Neck    Comprehensive Metabolic Panel    Protime-INR    [place] Draw    CBC Auto Differential    NPO Diet NPO Type: Strict NPO    Undress and Gown    Continuous Pulse Oximetry    Vital Signs    Perform NIH Stroke Scale    Nursing Dysphagia Screening (Complete Prior to Giving anything PO)    RN to Place Order SLP Consult (IF swallow screen failed) - Eval & Treat Choosing Reason of RN Dysphagia Screen Failed    Oxygen Therapy- Nasal Cannula; Titrate 1-6 LPM Per SpO2; 90 - 95%    POC Glucose Once    POC Glucose Once    ECG 12 Lead ED Triage Standing Order; Stroke (Onset >12 hrs)    Insert Peripheral IV    CBC & Differential    Green Top (Gel)    Lavender Top    Gold Top - SST    Light Blue Top         OUTPATIENT MEDICATION MANAGEMENT:  Current Facility-Administered Medications Ordered in Epic   Medication Dose Route Frequency Provider Last Rate Last Admin    sodium chloride 0.9 % flush 10 mL  10 mL Intravenous PRN Franko Bobby MD         Current Outpatient Medications Ordered in Epic   Medication Sig Dispense Refill    acetaminophen (TYLENOL) 325 MG tablet Take 2 tablets by mouth Every 4 (Four) Hours As Needed.      aspirin 81 MG chewable tablet Chew 1 tablet Daily.      atenolol (TENORMIN) 100 MG tablet Take 1 tablet by mouth Daily. 90 tablet 0    atorvastatin (LIPITOR) 40 MG tablet Take 1 tablet by mouth Daily.      ibuprofen (ADVIL,MOTRIN) 200 MG tablet Take 1 tablet by mouth Every 6 (Six) Hours As Needed for Mild Pain.      losartan (COZAAR) 50 MG tablet Take 1 tablet by mouth Daily.      metFORMIN HCl 750 MG tablet Take 750 mg by mouth Daily With Breakfast.      sildenafil (REVATIO) 20 MG tablet Take 1 tablet by mouth Daily As Needed.      tamsulosin (FLOMAX) 0.4 MG  capsule 24 hr capsule Take 1 capsule by mouth Daily.           PROCEDURES  Procedures            PROGRESS, DATA ANALYSIS, CONSULTS, AND MEDICAL DECISION MAKING  All labs have been independently interpreted by me.  All radiology studies have been reviewed by me. All EKG's have been independently viewed and interpreted by me.  Discussion below represents my analysis of pertinent findings related to patient's condition, differential diagnosis, treatment plan and final disposition.    Differential diagnosis includes but is not limited to stroke, TIA, TGA, hypertensive urgency, hypertensive emergency.    Clinical Scores:                       Total (NIH Stroke Scale): 0               ED Course as of 07/09/25 1509   Wed Jul 09, 2025   1401 Discussing with Dr. Gaspar with stroke neurology.  He recommends stat CTAs but no Team D process.  He recommends blood pressure control, admission to the observation unit for MRI.  This sounds like transient global amnesia and not a stroke. [TR]   1412 Glucose: 97 [TR]   1412 BP(!): 186/82 [TR]   1423 Hemoglobin: 16.9 [TR]   1426 Discussing with Kiley Levy with the observation unit.  She accepts for admission. [TR]   1427 INR: 1.04 [TR]   1433 EKG PROCEDURE    EKG time: 1430  Rhythm/Rate: Normal sinus, rate 55  P waves and UT: Normal P, normal UT  QRS, axis: Narrow QRS, normal axis  ST and T waves: No acute    Independently Interpreted by me  Not significantly changed compared to prior 7/29/2023   [TR]   1436 Creatinine: 1.14 [TR]   1452 Dr. Gaspar has evaluated.  He requests observation unit admission with MRI and blood pressure control. [TR]   1459 XR Chest 1 View  My independent interpretation of the imaging study is no dense consolidation [TR]      ED Course User Index  [TR] Farnko Bobby MD             AS OF 15:09 EDT VITALS:    BP - 167/74  HR - 54  TEMP - 97.7 °F (36.5 °C) (Oral)  O2 SATS - 100%    COMPLEXITY OF CARE  The patient requires admission.      DIAGNOSIS  Final  diagnoses:   TGA (transient global amnesia)         DISPOSITION  ED Disposition       ED Disposition   Decision to Admit    Condition   --    Comment   --                Please note that portions of this document were completed with a voice recognition program.    Note Disclaimer: At [facility], we believe that sharing information builds trust and better relationships. You are receiving this note because you recently visited [facility]. It is possible you will see health information before a provider has talked with you about it. This kind of information can be easy to misunderstand. To help you fully understand what it means for your health, we urge you to discuss this note with your provider.         Franko Bobby MD  07/09/25 0738

## 2025-07-09 NOTE — PROGRESS NOTES
MARK CHILDS Attestation Note     I supervised care provided by the midlevel provider. We have discussed this patient's history, physical exam, and treatment plan. I have reviewed the midlevel provider's note and I agree with the midlevel provider's findings and plan of care.   SHARED VISIT: This visit was performed by BOTH a physician and an APC. The substantive portion of the medical decision making was performed by this attesting physician who made or approved the management plan and takes responsibility for patient management. All studies in the APC note (if performed) were independently interpreted by me.   I have personally had a face to face encounter with the patient.   My personal findings are documented below:      Subjective  Pt is a 64 y.o. male admitted from Emergency Department for evaluation and treatment of short-term amnesia.  Patient was working this morning around noon and difficulty remembering short-term facts.  He is continue to be confused and has difficulty remembering things in the recent past.  Denies any injury.  Denies headache.  Patient can answer questions and follows commands appropriately    Physical Exam  GENERAL: Alert and in NAD, Vitals reviewed-blood pressure 166/75.  Heart rate, temperature and O2 sats normal  HENT: nares patent  EYES: no scleral icterus  CV: regular rhythm, regular rate-no murmur  RESPIRATORY: normal effort, clear to auscultation bilaterally  ABDOMEN: soft  MUSCULOSKELETAL: no deformity  NEURO: Strength sensation and coordination are grossly intact.  Speech and mentation are unremarkable  SKIN: warm, dry    Assessment/Plan  I discussed tx and evaluation of this patient with MISSY Levy.  EKG and labs fairly unremarkable without significant arrhythmia or metabolic disturbance.  CT scan and CT angiogram of the brain fairly unremarkable without obvious intracranial abnormality.    Appreciate neurology advice and recommendations.  Will get EEG and MRI of brain for  further evaluation and workup.

## 2025-07-10 ENCOUNTER — APPOINTMENT (OUTPATIENT)
Dept: CARDIOLOGY | Facility: HOSPITAL | Age: 65
End: 2025-07-10
Payer: COMMERCIAL

## 2025-07-10 VITALS
OXYGEN SATURATION: 94 % | DIASTOLIC BLOOD PRESSURE: 77 MMHG | BODY MASS INDEX: 42.66 KG/M2 | RESPIRATION RATE: 18 BRPM | WEIGHT: 315 LBS | TEMPERATURE: 98.2 F | HEIGHT: 72 IN | HEART RATE: 53 BPM | SYSTOLIC BLOOD PRESSURE: 167 MMHG

## 2025-07-10 LAB
ALBUMIN SERPL-MCNC: 3.3 G/DL (ref 3.5–5.2)
ALBUMIN/GLOB SERPL: 1.3 G/DL
ALP SERPL-CCNC: 41 U/L (ref 39–117)
ALT SERPL W P-5'-P-CCNC: 14 U/L (ref 1–41)
ANION GAP SERPL CALCULATED.3IONS-SCNC: 9.6 MMOL/L (ref 5–15)
AORTIC DIMENSIONLESS INDEX: 0.59 (DI)
ASCENDING AORTA: 4.4 CM
AST SERPL-CCNC: 21 U/L (ref 1–40)
AV MEAN PRESS GRAD SYS DOP V1V2: 10.1 MMHG
AV VMAX SYS DOP: 210.4 CM/SEC
BH CV ECHO MEAS - ACS: 1.98 CM
BH CV ECHO MEAS - AO MAX PG: 17.7 MMHG
BH CV ECHO MEAS - AO ROOT AREA (BSA CORRECTED): 1.7 CM2
BH CV ECHO MEAS - AO ROOT DIAM: 4.6 CM
BH CV ECHO MEAS - AO V2 VTI: 47.8 CM
BH CV ECHO MEAS - AVA(I,D): 2.39 CM2
BH CV ECHO MEAS - EDV(CUBED): 150.3 ML
BH CV ECHO MEAS - EDV(MOD-SP2): 116 ML
BH CV ECHO MEAS - EDV(MOD-SP4): 177 ML
BH CV ECHO MEAS - EF(MOD-SP2): 64.7 %
BH CV ECHO MEAS - EF(MOD-SP4): 65.5 %
BH CV ECHO MEAS - ESV(CUBED): 40.5 ML
BH CV ECHO MEAS - ESV(MOD-SP2): 41 ML
BH CV ECHO MEAS - ESV(MOD-SP4): 61 ML
BH CV ECHO MEAS - FS: 35.4 %
BH CV ECHO MEAS - IVS/LVPW: 0.82 CM
BH CV ECHO MEAS - IVSD: 1.2 CM
BH CV ECHO MEAS - LA DIMENSION: 3.8 CM
BH CV ECHO MEAS - LAT PEAK E' VEL: 8.7 CM/SEC
BH CV ECHO MEAS - LV DIASTOLIC VOL/BSA (35-75): 64.2 CM2
BH CV ECHO MEAS - LV MASS(C)D: 297.3 GRAMS
BH CV ECHO MEAS - LV MAX PG: 5.3 MMHG
BH CV ECHO MEAS - LV MEAN PG: 2.7 MMHG
BH CV ECHO MEAS - LV SYSTOLIC VOL/BSA (12-30): 22.1 CM2
BH CV ECHO MEAS - LV V1 MAX: 114.8 CM/SEC
BH CV ECHO MEAS - LV V1 VTI: 28.2 CM
BH CV ECHO MEAS - LVIDD: 5.3 CM
BH CV ECHO MEAS - LVIDS: 3.4 CM
BH CV ECHO MEAS - LVOT AREA: 4.1 CM2
BH CV ECHO MEAS - LVOT DIAM: 2.27 CM
BH CV ECHO MEAS - LVPWD: 1.46 CM
BH CV ECHO MEAS - MED PEAK E' VEL: 7.4 CM/SEC
BH CV ECHO MEAS - MV A DUR: 0.15 SEC
BH CV ECHO MEAS - MV A MAX VEL: 105 CM/SEC
BH CV ECHO MEAS - MV DEC SLOPE: 410.1 CM/SEC2
BH CV ECHO MEAS - MV DEC TIME: 0.29 SEC
BH CV ECHO MEAS - MV E MAX VEL: 117 CM/SEC
BH CV ECHO MEAS - MV E/A: 1.11
BH CV ECHO MEAS - MV MAX PG: 5.7 MMHG
BH CV ECHO MEAS - MV MEAN PG: 2.7 MMHG
BH CV ECHO MEAS - MV P1/2T: 90.2 MSEC
BH CV ECHO MEAS - MV V2 VTI: 48.8 CM
BH CV ECHO MEAS - MVA(P1/2T): 2.44 CM2
BH CV ECHO MEAS - MVA(VTI): 2.34 CM2
BH CV ECHO MEAS - PA ACC TIME: 0.2 SEC
BH CV ECHO MEAS - PA V2 MAX: 133 CM/SEC
BH CV ECHO MEAS - PULM A REVS DUR: 0.15 SEC
BH CV ECHO MEAS - PULM A REVS VEL: 28 CM/SEC
BH CV ECHO MEAS - PULM DIAS VEL: 36.7 CM/SEC
BH CV ECHO MEAS - PULM S/D: 1.56
BH CV ECHO MEAS - PULM SYS VEL: 57.1 CM/SEC
BH CV ECHO MEAS - QP/QS: 0.7
BH CV ECHO MEAS - RAP SYSTOLE: 3 MMHG
BH CV ECHO MEAS - RV MAX PG: 1.01 MMHG
BH CV ECHO MEAS - RV V1 MAX: 50.1 CM/SEC
BH CV ECHO MEAS - RV V1 VTI: 12.8 CM
BH CV ECHO MEAS - RVDD: 2.28 CM
BH CV ECHO MEAS - RVOT DIAM: 2.8 CM
BH CV ECHO MEAS - RVSP: 22.6 MMHG
BH CV ECHO MEAS - SV(LVOT): 114.1 ML
BH CV ECHO MEAS - SV(MOD-SP2): 75 ML
BH CV ECHO MEAS - SV(MOD-SP4): 116 ML
BH CV ECHO MEAS - SV(RVOT): 79.9 ML
BH CV ECHO MEAS - SVI(LVOT): 41.4 ML/M2
BH CV ECHO MEAS - SVI(MOD-SP2): 27.2 ML/M2
BH CV ECHO MEAS - SVI(MOD-SP4): 42.1 ML/M2
BH CV ECHO MEAS - TAPSE (>1.6): 2.9 CM
BH CV ECHO MEAS - TR MAX PG: 19.6 MMHG
BH CV ECHO MEAS - TR MAX VEL: 221.3 CM/SEC
BH CV ECHO MEASUREMENTS AVERAGE E/E' RATIO: 14.53
BH CV ECHO SHUNT ASSESSMENT PERFORMED (HIDDEN SCRIPTING): 1
BH CV XLRA - RV BASE: 3.5 CM
BH CV XLRA - RV LENGTH: 8.3 CM
BH CV XLRA - RV MID: 3.1 CM
BH CV XLRA - TDI S': 15.4 CM/SEC
BILIRUB SERPL-MCNC: 0.7 MG/DL (ref 0–1.2)
BUN SERPL-MCNC: 15 MG/DL (ref 8–23)
BUN/CREAT SERPL: 14.9 (ref 7–25)
CALCIUM SPEC-SCNC: 8.7 MG/DL (ref 8.6–10.5)
CHLORIDE SERPL-SCNC: 107 MMOL/L (ref 98–107)
CO2 SERPL-SCNC: 21.4 MMOL/L (ref 22–29)
CREAT SERPL-MCNC: 1.01 MG/DL (ref 0.76–1.27)
DEPRECATED RDW RBC AUTO: 46.4 FL (ref 37–54)
EGFRCR SERPLBLD CKD-EPI 2021: 83 ML/MIN/1.73
ERYTHROCYTE [DISTWIDTH] IN BLOOD BY AUTOMATED COUNT: 14.2 % (ref 12.3–15.4)
GLOBULIN UR ELPH-MCNC: 2.6 GM/DL
GLUCOSE BLDC GLUCOMTR-MCNC: 90 MG/DL (ref 70–130)
GLUCOSE BLDC GLUCOMTR-MCNC: 95 MG/DL (ref 70–130)
GLUCOSE SERPL-MCNC: 92 MG/DL (ref 65–99)
HCT VFR BLD AUTO: 47.1 % (ref 37.5–51)
HGB BLD-MCNC: 15.2 G/DL (ref 13–17.7)
LEFT ATRIUM VOLUME INDEX: 22.6 ML/M2
LV EF BIPLANE MOD: 65.6 %
MCH RBC QN AUTO: 28.9 PG (ref 26.6–33)
MCHC RBC AUTO-ENTMCNC: 32.3 G/DL (ref 31.5–35.7)
MCV RBC AUTO: 89.5 FL (ref 79–97)
PLATELET # BLD AUTO: 157 10*3/MM3 (ref 140–450)
PMV BLD AUTO: 10 FL (ref 6–12)
POTASSIUM SERPL-SCNC: 4 MMOL/L (ref 3.5–5.2)
PROT SERPL-MCNC: 5.9 G/DL (ref 6–8.5)
QT INTERVAL: 464 MS
QTC INTERVAL: 445 MS
RBC # BLD AUTO: 5.26 10*6/MM3 (ref 4.14–5.8)
SINUS: 3.7 CM
SODIUM SERPL-SCNC: 138 MMOL/L (ref 136–145)
STJ: 3.3 CM
WBC NRBC COR # BLD AUTO: 6.49 10*3/MM3 (ref 3.4–10.8)

## 2025-07-10 PROCEDURE — 85027 COMPLETE CBC AUTOMATED: CPT | Performed by: NURSE PRACTITIONER

## 2025-07-10 PROCEDURE — G0378 HOSPITAL OBSERVATION PER HR: HCPCS

## 2025-07-10 PROCEDURE — 25510000001 PERFLUTREN 6.52 MG/ML SUSPENSION 2 ML VIAL: Performed by: NURSE PRACTITIONER

## 2025-07-10 PROCEDURE — 93306 TTE W/DOPPLER COMPLETE: CPT

## 2025-07-10 PROCEDURE — 80053 COMPREHEN METABOLIC PANEL: CPT | Performed by: NURSE PRACTITIONER

## 2025-07-10 PROCEDURE — 93306 TTE W/DOPPLER COMPLETE: CPT | Performed by: INTERNAL MEDICINE

## 2025-07-10 PROCEDURE — 82948 REAGENT STRIP/BLOOD GLUCOSE: CPT

## 2025-07-10 RX ORDER — AMLODIPINE BESYLATE 5 MG/1
5 TABLET ORAL DAILY
Qty: 12 TABLET | Refills: 0 | Status: SHIPPED | OUTPATIENT
Start: 2025-07-10 | End: 2025-07-22

## 2025-07-10 RX ADMIN — PERFLUTREN 2 ML: 6.52 INJECTION, SUSPENSION INTRAVENOUS at 10:39

## 2025-07-10 RX ADMIN — LOSARTAN POTASSIUM 50 MG: 50 TABLET, FILM COATED ORAL at 04:40

## 2025-07-10 RX ADMIN — TAMSULOSIN HYDROCHLORIDE 0.4 MG: 0.4 CAPSULE ORAL at 08:03

## 2025-07-10 RX ADMIN — ACETAMINOPHEN 325MG 650 MG: 325 TABLET ORAL at 01:12

## 2025-07-10 RX ADMIN — LOSARTAN POTASSIUM 50 MG: 50 TABLET, FILM COATED ORAL at 15:36

## 2025-07-10 RX ADMIN — Medication 10 ML: at 08:05

## 2025-07-10 RX ADMIN — ASPIRIN 325 MG ORAL TABLET 325 MG: 325 PILL ORAL at 08:03

## 2025-07-10 NOTE — PROGRESS NOTES
"DOS: 7/10/2025  NAME: Tip Pruitt   : 1960  PCP: Melissa Soares MD    Chief Complaint   Patient presents with    Altered Mental Status         Subjective: Pt seen in follow up, however the problem is new to me.  Patient lying on the stretcher in the echo department.  States he feels much better today and feels he is at his baseline.  Able to recall things that happened 20 years ago and is having no trouble forming new memories.  States he still does not really remember much of the day of admission.    Objective:  Vital signs:   Vitals:    07/10/25 0748 07/10/25 0753 07/10/25 1004 07/10/25 1133   BP: 172/81  168/82 166/73   BP Location: Right arm   Left arm   Patient Position: Lying   Lying   Pulse: 55 56 57 53   Resp: 18   18   Temp: 98.4 °F (36.9 °C)   98.7 °F (37.1 °C)   TempSrc: Oral   Oral   SpO2:       Weight:   (!) 159 kg (350 lb)    Height:   182.9 cm (72\")        Current Facility-Administered Medications:     acetaminophen (TYLENOL) tablet 650 mg, 650 mg, Oral, Q4H PRN, Mildred, Kiley, APRN, 650 mg at 07/10/25 0112    aspirin tablet 325 mg, 325 mg, Oral, Daily, 325 mg at 07/10/25 0803 **OR** aspirin suppository 300 mg, 300 mg, Rectal, Daily, Kiley Levy, APRN    atenolol (TENORMIN) tablet 100 mg, 100 mg, Oral, Daily, Kiley Levy, APRN, 100 mg at 25 1846    atorvastatin (LIPITOR) tablet 40 mg, 40 mg, Oral, Nightly, Kiley Levy, APRN    dextrose (D50W) (25 g/50 mL) IV injection 25 g, 25 g, Intravenous, Q15 Min PRN, Kiley Levy APRN    dextrose (GLUTOSE) oral gel 15 g, 15 g, Oral, Q15 Min PRN, Kiley Levy, APRN    glucagon (GLUCAGEN) injection 1 mg, 1 mg, Intramuscular, Q15 Min PRN, Kiley Levy, MISSY    insulin lispro (HUMALOG/ADMELOG) injection 2-9 Units, 2-9 Units, Subcutaneous, 4x Daily AC & at Bedtime, Kiley Levy APRN    losartan (COZAAR) tablet 50 mg, 50 mg, Oral, Q12H, Kiley Levy APRN, 50 mg at 07/10/25 0440    ondansetron (ZOFRAN) injection 4 mg, 4 mg, Intravenous, Q6H " PRN, Herth, Kiley, APRN    sodium chloride 0.9 % flush 10 mL, 10 mL, Intravenous, PRN, Franko Bobby MD    sodium chloride 0.9 % flush 10 mL, 10 mL, Intravenous, Q12H, Herth, Kiley, APRN, 10 mL at 07/10/25 0805    sodium chloride 0.9 % flush 10 mL, 10 mL, Intravenous, PRN, Herth, Kiley, APRN    sodium chloride 0.9 % infusion 40 mL, 40 mL, Intravenous, PRN, Herth, Kiley, APRN    tamsulosin (FLOMAX) 24 hr capsule 0.4 mg, 0.4 mg, Oral, Daily, Herth, Kiley, APRN, 0.4 mg at 07/10/25 0803    PRN meds    acetaminophen    dextrose    dextrose    glucagon (human recombinant)    ondansetron    sodium chloride    sodium chloride    sodium chloride    No current facility-administered medications on file prior to encounter.     Current Outpatient Medications on File Prior to Encounter   Medication Sig    acetaminophen (TYLENOL) 325 MG tablet Take 2 tablets by mouth Every 4 (Four) Hours As Needed.    aspirin 81 MG chewable tablet Chew 1 tablet Daily.    atenolol (TENORMIN) 100 MG tablet Take 1 tablet by mouth Daily.    atorvastatin (LIPITOR) 40 MG tablet Take 1 tablet by mouth Daily.    ibuprofen (ADVIL,MOTRIN) 200 MG tablet Take 1 tablet by mouth Every 6 (Six) Hours As Needed for Mild Pain.    losartan (COZAAR) 50 MG tablet Take 1 tablet by mouth Daily.    metFORMIN HCl 750 MG tablet Take 750 mg by mouth Daily With Breakfast.    sildenafil (REVATIO) 20 MG tablet Take 1 tablet by mouth Daily As Needed.    tamsulosin (FLOMAX) 0.4 MG capsule 24 hr capsule Take 1 capsule by mouth Daily.       General appearance: Obese male, NAD, alert and cooperative   HEENT: Normocephalic, atraumatic    Neurological:   MS: oriented x3, normal attention/concentration, language intact, no neglect  CN: visual fields full, EOMI, facial sensation equal, no facial droop, shoulder shrug equal, tongue midline  Motor: 5/5 in all 4 ext.  Sensory: light touch sensation intact in all 4 ext.  Coordination: Normal finger to nose test    Laboratory  "results:  Lab Results   Component Value Date    TSH 0.817 06/28/2021     Lab Results   Component Value Date    PBCLDFQU22 291 08/10/2020     Lab Results   Component Value Date    HGBA1C 6.10 (H) 07/09/2025     Lab Results   Component Value Date    GLUCOSE 92 07/10/2025    BUN 15.0 07/10/2025    CREATININE 1.01 07/10/2025    EGFRIFAFRI >60 09/20/2022    BCR 14.9 07/10/2025    K 4.0 07/10/2025    CO2 21.4 (L) 07/10/2025    CALCIUM 8.7 07/10/2025    ALBUMIN 3.3 (L) 07/10/2025    LABIL2 1.1 09/20/2022    AST 21 07/10/2025    ALT 14 07/10/2025     Lab Results   Component Value Date    WBC 6.49 07/10/2025    HGB 15.2 07/10/2025    HCT 47.1 07/10/2025    MCV 89.5 07/10/2025     07/10/2025     Brief Urine Lab Results  (Last result in the past 365 days)        Color   Clarity   Blood   Leuk Est   Nitrite   Protein   CREAT   Urine HCG        07/09/25 1845 Yellow   Clear   Small (1+)   Negative   Negative   30 mg/dL (1+)                 No results found for: \"ACANTHNAEG\", \"AFBCX\", \"BPERTUSSISCX\", \"BLOODCX\"  No results found for: \"BCIDPCR\", \"CXREFLEX\", \"CSFCX\", \"CULTURETIS\"  No results found for: \"CULTURES\", \"HSVCX\", \"URCX\"  No results found for: \"EYECULTURE\", \"GCCX\", \"HSVCULTURE\", \"LABHSV\"  No results found for: \"LEGIONELLA\", \"MRSACX\", \"MUMPSCX\", \"MYCOPLASCX\"  No results found for: \"NOCARDIACX\", \"STOOLCX\"  No results found for: \"THROATCX\", \"UNSTIMCULT\", \"URINECX\", \"CULTURE\", \"VZVCULTUR\"  No results found for: \"VIRALCULTU\", \"WOUNDCX\"  Pain Management Panel           No data to display                Review and interpretation of imaging:  MRI Brain Without Contrast  Result Date: 7/9/2025   No acute infarct.  This report was finalized on 7/9/2025 5:42 PM by Dr. Cornelio Camarena M.D on Workstation: [id number]      CT Angiogram Head w AI Analysis of LVO  Result Date: 7/9/2025  1. No evidence for acute intracranial abnormality or large vessel occlusion. 2. Atherosclerotic calcifications are present with calcified plaque " towards the right vertebral artery with moderate stenosis. Calcification associated with the intracranial segment left vertebral artery with mild narrowing. 3. Multilevel endplate spur formation within the cervical spine. 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    This report was finalized on 7/9/2025 5:29 PM by Jerome Yanez M.D on Workstation: [id number]      CT Angiogram Neck  Result Date: 7/9/2025  1. No evidence for acute intracranial abnormality or large vessel occlusion. 2. Atherosclerotic calcifications are present with calcified plaque towards the right vertebral artery with moderate stenosis. Calcification associated with the intracranial segment left vertebral artery with mild narrowing. 3. Multilevel endplate spur formation within the cervical spine. 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    This report was finalized on 7/9/2025 5:29 PM by Jerome Yanez M.D on Workstation: [id number]      XR Chest 1 View  Result Date: 7/9/2025  No focal pulmonary consolidation. Borderline heart size. Tortuous aorta. Follow-up as clinically indicated.  This report was finalized on 7/9/2025 2:57 PM by Dr. 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  He was reportedly out doing yard work that morning and when his wife saw him a little bit later she noted that he was having trouble recalling anything over the last 2 weeks and was asking repetitive questions.     He presented with a BP of 186/82.  He did report a mild left frontal headache that morning.  He had a CTA of his head neck done  that did not show any large vessel occlusion or high-grade flow-limiting stenosis.  Noncontrasted MRI brain was obtained and did not show any acute intracranial abnormalities.  Routine EEG was also normal.    Diagnosis:  Transient global amnesia  Essential hypertension  Obstructive sleep apnea compliant with CPAP  Morbidly obese    Plan:  - MRI, CTA, EEG all reviewed with Dr. Gaspar and are unremarkable. He was hypertensive on arrival and did report a mild headache. Suspect symptoms related to TGA with possible trigger of physical stress from yard work, migraineous component, or hypertension.  - Asked that he monitor his BP over the next week. BP goal 130/80 or less.  - Continue nightly compliance with CPAP and with naps.  We will sign off and see again per request.     Case discussed with patient and Dr. Gaspar, and he agrees with plan above.     MISSY Estes

## 2025-07-10 NOTE — DISCHARGE SUMMARY
ED OBSERVATION PROGRESS/DISCHARGE SUMMARY    Date of Admission: 7/9/2025   LOS: 0 days   PCP: Melissa Soares MD    Final Diagnosis transient global amnesia      Subjective     Hospital Outcome:   Tip Pruitt is a  64 y.o.  male who was admitted to the ED observation unit with complaint of altered mental status.  Patient had a sudden onset this afternoon of repetitive questioning memory loss.     7/10/2025.    5:14 PM.  Patient's been evaluated by neurology.  Diagnosis transient global amnesia.  MRI brain with contrast showed no acute process.  CTA head and neck with a analysis of LVO show no large vessel occlusion or acute abnormality.  It is felt symptoms were related to TGA with possible trigger physical stress from yardwork, migrainosus component hypertension.  Patient is to monitor his blood pressure over the course of the next week with a goal of 130/80 or less.  Compliance with CPAP discussed.  Patient cleared for discharge.  We will have him to follow-up with primary care for interim management, further blood pressure monitoring and all other issues.  Should he have any further concerns will have him to follow-up with [facility].  Will have him to keep a diary twice daily over the course of the next week and follow-up with primary care for potential medication adjustments.       ROS:  General: no fevers, chills  Respiratory: no cough, dyspnea  Cardiovascular: no chest pain, palpitations  Abdomen: No abdominal pain, nausea, vomiting, or diarrhea  Neurologic: No focal weakness    Objective   Physical Exam:  I have reviewed the vital signs.  Temp:  [97.2 °F (36.2 °C)-98.7 °F (37.1 °C)] 98.2 °F (36.8 °C)  Heart Rate:  [53-57] 53  Resp:  [18] 18  BP: (149-172)/(73-93) 167/77  General Appearance:    Alert, cooperative, no distress  Head:    Normocephalic, atraumatic  Eyes:    Sclerae anicteric  Neck:   Supple, no mass  Lungs: Clear to auscultation bilaterally, respirations  unlabored  Heart: Regular rate and rhythm, S1 and S2 normal, no murmur, rub or gallop  Abdomen:  Soft, nontender, bowel sounds active, nondistended  Extremities: No clubbing, cyanosis, or edema to lower extremities  Pulses:  2+ and symmetric in distal lower extremities  Skin: No rashes   Neurologic: Oriented x3, Normal strength to extremities    Results Review:    I have reviewed the labs, radiology results and diagnostic studies.    Results from last 7 days   Lab Units 07/10/25  0404   WBC 10*3/mm3 6.49   HEMOGLOBIN g/dL 15.2   HEMATOCRIT % 47.1   PLATELETS 10*3/mm3 157     Results from last 7 days   Lab Units 07/10/25  0404 07/09/25  1404   SODIUM mmol/L 138 141   POTASSIUM mmol/L 4.0 4.3   CHLORIDE mmol/L 107 106   CO2 mmol/L 21.4* 25.6   BUN mg/dL 15.0 18.0   CREATININE mg/dL 1.01 1.14   CALCIUM mg/dL 8.7 9.7   BILIRUBIN mg/dL 0.7 0.7   ALK PHOS U/L 41 52   ALT (SGPT) U/L 14 21   AST (SGOT) U/L 21 24   GLUCOSE mg/dL 92 108*     Imaging Results (Last 24 Hours)       Procedure Component Value Units Date/Time    MRI Brain Without Contrast [[id number]] Collected: 07/09/25 1740     Updated: 07/09/25 1746    Narrative:      MRI BRAIN WO CONTRAST-     INDICATIONS: Stroke, follow-up     TECHNIQUE: Multiplanar multisequence noncontrast magnetic resonance  imaging of the brain.     COMPARISON: CT from same date     FINDINGS:     The diffusion-weighted images show no restricted diffusion to suggest  acute infarct.     The midline structures appear unremarkable.     The brain parenchyma shows minimal periventricular white matter T2 FLAIR  signal hyperintensities, compatible with chronic small vessel ischemic  change in a patient this age.     Flow voids in the major arteries at the base of the brain appear  unremarkable.     The paranasal sinuses, mastoid air cells, and orbits appear  unremarkable.       Impression:         No acute infarct.     This report was finalized on 7/9/2025 5:42 PM by Dr. Cornelio Camarena M.D on  Workstation: [id number]       CT Angiogram Head w AI Analysis of LVO [[id number]] Collected: 07/09/25 1631     Updated: 07/09/25 1732    Narrative:      CT ANGIOGRAM NECK-, CT ANGIOGRAM HEAD W AI ANALYSIS OF LVO-     HISTORY: Acute loss of memory. Stroke.     TECHNIQUE:  CT head without contrast followed by IV contrast  administration and CT angiogram head and neck in the arterial phase of  contrast.  Multiplanar reconstructions and 3D volume rendering.   Post-contrast enhanced head CT obtained. Radiation dose reduction  techniques were utilized, including automated exposure control and  exposure modulation based on body size.     COMPARISON: None     FINDINGS: Noncontrasted head CT demonstrates no abnormal area of  increased attenuation intra axially to suggest hemorrhage. No  extra-axial fluid collection is observed. There is no evidence for  cerebral edema or mass effect or shift of the midline structures.     Atherosclerotic calcifications are present involving the thoracic aorta.  Great vessel origins are patent. There are calcified plaques involving  the right carotid bulb both proximal ICAs without NASCET stenosis.  Atherosclerotic calcifications are present involving the cavernous and  supracavernous ICAs with up to mild narrowing. Both middle cerebral  arteries and intracerebral arteries are patent.     Calcified plaque origin right vertebral artery with moderate stenosis.  Limited visualization of the proximal left vertebral artery associated  with adjacent venous contrast. Calcified plaque intracranial segment  left vertebral artery with mild narrowing. Basilar artery, both  posterior cerebral arteries are patent.     Postcontrast enhanced head CT demonstrates no abnormal intracranial  enhancement.     There is multilevel exuberant endplate spur formation within the  thoracic spine compatible with DISH. 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       Impression:       1. No evidence for acute intracranial abnormality or large vessel  occlusion.  2. Atherosclerotic calcifications are present with calcified plaque  towards the right vertebral artery with moderate stenosis. Calcification  associated with the intracranial segment left vertebral artery with mild  narrowing.  3. Multilevel endplate spur formation within the cervical spine.  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           This report was finalized on 7/9/2025 5:29 PM by Jerome Yanez M.D on  Workstation: [id number]       CT Angiogram Neck [[id number]] Collected: 07/09/25 1631     Updated: 07/09/25 1732    Narrative:      CT ANGIOGRAM NECK-, CT ANGIOGRAM HEAD W AI ANALYSIS OF LVO-     HISTORY: Acute loss of memory. Stroke.     TECHNIQUE:  CT head without contrast followed by IV contrast  administration and CT angiogram head and neck in the arterial phase of  contrast.  Multiplanar reconstructions and 3D volume rendering.   Post-contrast enhanced head CT obtained. Radiation dose reduction  techniques were utilized, including automated exposure control and  exposure modulation based on body size.     COMPARISON: None     FINDINGS: Noncontrasted head CT demonstrates no abnormal area of  increased attenuation intra axially to suggest hemorrhage. No  extra-axial fluid collection is observed. There is no evidence for  cerebral edema or mass effect or shift of the midline structures.     Atherosclerotic calcifications are present involving the thoracic aorta.  Great vessel origins are patent. There are calcified plaques involving  the right carotid bulb both proximal ICAs without NASCET stenosis.  Atherosclerotic calcifications are present involving the cavernous and  supracavernous ICAs with up to mild narrowing. Both middle cerebral  arteries and intracerebral arteries are patent.     Calcified plaque origin right vertebral artery with moderate stenosis.  Limited visualization of the  proximal left vertebral artery associated  with adjacent venous contrast. Calcified plaque intracranial segment  left vertebral artery with mild narrowing. Basilar artery, both  posterior cerebral arteries are patent.     Postcontrast enhanced head CT demonstrates no abnormal intracranial  enhancement.     There is multilevel exuberant endplate spur formation within the  thoracic spine compatible with DISH. 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       Impression:      1. No evidence for acute intracranial abnormality or large vessel  occlusion.  2. Atherosclerotic calcifications are present with calcified plaque  towards the right vertebral artery with moderate stenosis. Calcification  associated with the intracranial segment left vertebral artery with mild  narrowing.  3. Multilevel endplate spur formation within the cervical spine.  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           This report was finalized on 7/9/2025 5:29 PM by Jerome Yanez M.D on  Workstation: [id number]               I have reviewed the medications.     Discharge Medications        ASK your doctor about these medications        Instructions Start Date   acetaminophen 325 MG tablet  Commonly known as: TYLENOL  Ask about: Which instructions should I use?   650 mg, Every 4 Hours PRN      aspirin 81 MG chewable tablet   81 mg, Daily      atenolol 100 MG tablet  Commonly known as: TENORMIN   100 mg, Oral, Daily      atorvastatin 40 MG tablet  Commonly known as: LIPITOR   1 tablet, Oral, Daily      ibuprofen 200 MG tablet  Commonly known as: ADVIL,MOTRIN   200 mg, Every 6 Hours PRN      losartan 50 MG tablet  Commonly known as: COZAAR   1 tablet, Oral, Daily      metFORMIN HCl 750 MG tablet   750 mg, Oral, Daily With Breakfast      sildenafil 20 MG tablet  Commonly known as: REVATIO   20 mg, Daily PRN      tamsulosin 0.4 MG capsule 24 hr capsule  Commonly known as:  FLOMAX   1 capsule, Oral, Daily              ---------------------------------------------------------------------------------------------  Assessment & Plan   Assessment/Problem List    Transient global amnesia      Plan:    Altered mental status  Suspect transient global amnesia  -CTA head neck negative  -EEG was normal without seizure or epileptic activity  -MRI brain negative  -Echocardiogram negative  -Patient cleared by neurology with plan above     Hypertension, poorly controlled  Continue with losartan 100 mg daily  Continue with atenolol 100 mg nightly  Twice daily blood pressure diary, close primary follow-up  A.  10-day course of amlodipine 5 mg daily will be prescribed and will have the patient take should his blood pressure should remain greater than 170/90         Type 2 diabetes with hyperglycemia  Hold metformin for 72 hours following administration of CT contrast  Low-moderate correctional insulin sliding scale protocol initiated    Disposition: Discharge    Follow-up after Discharge: Primary care, neurology as needed    This note will serve as a discharge summary    Eliseo Stark III, PA 07/10/25 17:27 EDT    I have worn appropriate PPE during this patient encounter, sanitized my hands both with entering and exiting patient's room.      30 minutes has been spent by [facility] providers in the care of this patient while under observation status on this date 07/10/25

## 2025-07-10 NOTE — DISCHARGE INSTRUCTIONS
Continue with your losartan and atenolol as previously directed.  Keep a twice daily blood pressure diary.  If consistently greater than 170/90 take the additional amlodipine 5 mg daily prescribed.  Follow-up with primary care in the next 5 to 7 days for medication adjustments if they are needed.    Follow-up with neurology should you have any further concerns.      Return to the ER with any further concerning symptoms.

## 2025-07-10 NOTE — PLAN OF CARE
Goal Outcome Evaluation:            Pt came in from the ED with amnesia forgetting events of the past few days. Nothing acute found on MRI. EEG pending. Stroke ruled out, no NIH. Hx of HTN, Hyperlipidemia, and displacement of lumbar disc. Last blood sugar was 108. Pt sinus subhash with periods of arrythmia. A&Ox4. Room air. Stand by assist. Wife at bedside. Neuro consult and echo in the morning.

## 2025-07-11 NOTE — CASE MANAGEMENT/SOCIAL WORK
Case Management Discharge Note      Final Note: Home via private vehicle         Selected Continued Care - Discharged on 7/10/2025 Admission date: 7/9/2025 - Discharge disposition: Home or Self Care      Destination    No services have been selected for the patient.                Durable Medical Equipment    No services have been selected for the patient.                Dialysis/Infusion    No services have been selected for the patient.                Home Medical Care    No services have been selected for the patient.                Therapy    No services have been selected for the patient.                Community Resources    No services have been selected for the patient.                Community & DME    No services have been selected for the patient.                    Transportation Services  Transportation: Private Transportation  Private: Car    Final Discharge Disposition Code: 01 - home or self-care